# Patient Record
Sex: FEMALE | Race: ASIAN | NOT HISPANIC OR LATINO | Employment: OTHER
[De-identification: names, ages, dates, MRNs, and addresses within clinical notes are randomized per-mention and may not be internally consistent; named-entity substitution may affect disease eponyms.]

---

## 2020-07-27 ENCOUNTER — RECORDS - HEALTHEAST (OUTPATIENT)
Dept: ADMINISTRATIVE | Facility: OTHER | Age: 80
End: 2020-07-27

## 2020-08-17 ENCOUNTER — OFFICE VISIT - HEALTHEAST (OUTPATIENT)
Dept: CARDIOLOGY | Facility: CLINIC | Age: 80
End: 2020-08-17

## 2020-08-17 DIAGNOSIS — R06.02 SOB (SHORTNESS OF BREATH): ICD-10-CM

## 2020-08-17 RX ORDER — SITAGLIPTIN 100 MG/1
TABLET, FILM COATED ORAL
Status: SHIPPED | COMMUNITY
Start: 2020-06-29 | End: 2022-11-18

## 2020-08-17 RX ORDER — LISINOPRIL 10 MG/1
10 TABLET ORAL DAILY
Status: SHIPPED | COMMUNITY
Start: 2020-07-24 | End: 2022-11-18

## 2020-08-18 LAB
ATRIAL RATE - MUSE: 63 BPM
DIASTOLIC BLOOD PRESSURE - MUSE: NORMAL
INTERPRETATION ECG - MUSE: NORMAL
P AXIS - MUSE: 18 DEGREES
PR INTERVAL - MUSE: 202 MS
QRS DURATION - MUSE: 80 MS
QT - MUSE: 404 MS
QTC - MUSE: 413 MS
R AXIS - MUSE: 0 DEGREES
SYSTOLIC BLOOD PRESSURE - MUSE: NORMAL
T AXIS - MUSE: 92 DEGREES
VENTRICULAR RATE- MUSE: 63 BPM

## 2020-08-26 ENCOUNTER — HOSPITAL ENCOUNTER (OUTPATIENT)
Dept: CARDIOLOGY | Facility: CLINIC | Age: 80
Discharge: HOME | End: 2020-08-26
Attending: INTERNAL MEDICINE

## 2020-08-26 DIAGNOSIS — R06.02 SOB (SHORTNESS OF BREATH): ICD-10-CM

## 2020-08-26 LAB
AORTIC ROOT: 3.3 CM
AORTIC VALVE MEAN VELOCITY: 123 CM/S
AR DECEL SLOPE: 1750 MM/S2
AR PEAK VELOCITY: 426 CM/S
ASCENDING AORTA: 3.6 CM
AV DIMENSIONLESS INDEX VTI: 0.7
AV MEAN GRADIENT: 7 MMHG
AV PEAK GRADIENT: 12.5 MMHG
AV REGURGITANT PEAK GRADIENT: 72.6 MMHG
AV REGURGITATION PRESSURE HALF TIME: 715 MS
AV VALVE AREA: 1.9 CM2
AV VELOCITY RATIO: 0.7
CV BLOOD PRESSURE: NORMAL MMHG
CV ECHO WEIGHT: 120 LBS
DOP CALC AO PEAK VEL: 177 CM/S
DOP CALC AO VTI: 41.5 CM
DOP CALC LVOT AREA: 2.83 CM2
DOP CALC LVOT DIAMETER: 1.9 CM
DOP CALC LVOT PEAK VEL: 127 CM/S
DOP CALC LVOT STROKE VOLUME: 80.5 CM3
DOP CALCLVOT PEAK VEL VTI: 28.4 CM
EJECTION FRACTION: 58 % (ref 55–75)
LA AREA 1: 13.5 CM2
LA AREA 2: 14.5 CM2
LEFT ATRIUM LENGTH: 4.87 CM
LEFT ATRIUM SIZE: 3.5 CM
LEFT ATRIUM TO AORTIC ROOT RATIO: 1.06 NO UNITS
LEFT ATRIUM VOLUME: 34.2 ML
LEFT VENTRICLE DIASTOLIC VOLUME: 55 CM3 (ref 46–106)
LEFT VENTRICLE SYSTOLIC VOLUME: 23 CM3 (ref 14–42)
LEFT VENTRICULAR OUTFLOW TRACT MEAN GRADIENT: 4 MMHG
LEFT VENTRICULAR OUTFLOW TRACT MEAN VELOCITY: 93 CM/S
LEFT VENTRICULAR OUTFLOW TRACT PEAK GRADIENT: 6 MMHG
MITRAL VALVE E/A RATIO: 0.7
MV AVERAGE E/E' RATIO: 14.3 CM/S
MV DECELERATION TIME: 289 MS
MV E'TISSUE VEL-LAT: 4.78 CM/S
MV E'TISSUE VEL-MED: 4.58 CM/S
MV LATERAL E/E' RATIO: 14
MV MEDIAL E/E' RATIO: 14.6
MV PEAK A VELOCITY: 98.7 CM/S
MV PEAK E VELOCITY: 66.9 CM/S
NUC REST DIASTOLIC VOLUME INDEX: 1920 LBS
TRICUSPID REGURGITATION PEAK PRESSURE GRADIENT: 27.5 MMHG
TRICUSPID VALVE ANULAR PLANE SYSTOLIC EXCURSION: 2.6 CM
TRICUSPID VALVE PEAK REGURGITANT VELOCITY: 262 CM/S

## 2020-09-14 ENCOUNTER — COMMUNICATION - HEALTHEAST (OUTPATIENT)
Dept: CARDIOLOGY | Facility: CLINIC | Age: 80
End: 2020-09-14

## 2021-06-04 VITALS — WEIGHT: 120 LBS

## 2021-06-04 VITALS
SYSTOLIC BLOOD PRESSURE: 100 MMHG | WEIGHT: 120.5 LBS | HEART RATE: 62 BPM | OXYGEN SATURATION: 98 % | RESPIRATION RATE: 18 BRPM | DIASTOLIC BLOOD PRESSURE: 54 MMHG

## 2021-06-10 NOTE — PROGRESS NOTES
[facility] [place] Note    Assessment:    Tracie Yen is a 79 y.o. old female with HL, DM, here for evaluation of slow HR prior to planned endoscopy.        Plan:  # Bradycardia/pre-procedural risk stratification  - in and of itself, her sinus bradycardia, even with a first degree AVB, should not preclude her from being able to get her endoscopy  - moreover, the fact that historically she is able to do more than a flight of stairs is reassuring and makes her intermediate risk for a low risk procedure  - repeat EKG shows NSR at 61w/ unchanged inferior q's, CA conduction slightly prolonged  - given her age and absence of any prior CV work up, would like to at least get a TTE, and if no decrease in LVEF or significant valvular disease, proceed w/ endoscopy as planned    MARY MALIN  [facility]  [phone]  ______________________________________________________________________    Subjective:  CC: Slow HR    I had the opportunity to see Tracie Yen at the [facility]. Tracie Yen is a 79 y.o. female with a known history of HL, DM, here for evaluation of slow HR prior to planned endoscopy.    She was seen in the ED end ogf June '20 for decreased PO intake, vomiting, consistent with gastroparesis by description. At that time, an EKG was done, significant for SB at 50's, 1st degree AVB, inferior Q's. She nor her PCP thought much of it, but as her endoscopy is being delayed due to abnromal EKG,s he presents today for Cardiology evaluation.    She denies CP/SOB/STARKS/orthopnea/PND/edema/symcope/N/V/D/F/C. She is able to do a flight of stairs per the daughter, mainly limited by a decade-old leg pain.    Not smoker/drinker.   ______________________________________________________________________      Review of Systems:   As noted in HPI, all others reviewed and are negative      Problem List:  Patient Active Problem List   Diagnosis     Hyperlipidemia     Type 2 Diabetes Mellitus -  Uncomplicated, Uncontrolled     Joint Pain, Localized In The Hip     Edema     Medical History:  Past Medical History:   Diagnosis Date     DM2 (diabetes mellitus, type 2) (H)      HLD (hyperlipidemia)      Surgical History:  No past surgical history on file.  Social History:  Social History     Socioeconomic History     Marital status:      Spouse name: Not on file     Number of children: Not on file     Years of education: Not on file     Highest education level: Not on file   Occupational History     Not on file   Social Needs     Financial resource strain: Not on file     Food insecurity     Worry: Not on file     Inability: Not on file     Transportation needs     Medical: Not on file     Non-medical: Not on file   Tobacco Use     Smoking status: Not on file   Substance and Sexual Activity     Alcohol use: Not on file     Drug use: Not on file     Sexual activity: Not on file   Lifestyle     Physical activity     Days per week: Not on file     Minutes per session: Not on file     Stress: Not on file   Relationships     Social connections     Talks on phone: Not on file     Gets together: Not on file     Attends Restoration service: Not on file     Active member of club or organization: Not on file     Attends meetings of clubs or organizations: Not on file     Relationship status: Not on file     Intimate partner violence     Fear of current or ex partner: Not on file     Emotionally abused: Not on file     Physically abused: Not on file     Forced sexual activity: Not on file   Other Topics Concern     Not on file   Social History Narrative     Not on file           Family History:  No family history on file.      Allergies:  No Known Allergies    Medications:  Current Outpatient Medications   Medication Sig Dispense Refill     aspirin 81 MG EC tablet Take 81 mg by mouth daily.       blood sugar diagnostic (GLUCOSE BLOOD) Strp OneTouch Ultra Blue In Vitro Strip. Test 2 to 3 times daily.        blood-glucose meter kit Use As Directed. OneTouch Ultra Mini w/Device Kit. Use to check blood sugars 2 to 3 times a day.       glimepiride (AMARYL) 4 MG tablet Take 8 mg by mouth every morning.       metFORMIN (GLUCOPHAGE) 1000 MG tablet Take 1,000 mg by mouth 2 (two) times a day.       ONETOUCH FINEPOINT LANCETS MISC Test 2 to 3 times a day.       pantoprazole (PROTONIX) 20 MG tablet Take 1 tablet (20 mg total) by mouth daily. 20 tablet 0     pioglitazone (ACTOS) 30 MG tablet Take 30 mg by mouth daily.       simvastatin (ZOCOR) 20 MG tablet Take 20 mg by mouth daily.       No current facility-administered medications for this visit.        Objective:   Vital signs:  There were no vitals taken for this visit.      Physical Exam:    GENERAL APPEARANCE: Alert, cooperative and in no acute distress.   HEENT: No scleral icterus. Oral mucuos membranes pink and moist.   NECK: JVP 6. No Hepatojugular reflux. Thyroid not visualized. No lymphadenopathy   CHEST: clear to auscultation   CARDIOVASCULAR: S1, S2 without murmur ,clicks or rubs. Brachial, radial and posterior tibial pulses are intact and symetric. No carotid bruits noted. No edema  ABDOMEN: Nontender. BS+. No bruits.   SKIN: No Xanthelasma   Musculoskeletal: No cyanosis, clubbing or swelling.      Lab Results:  LIPIDS:  No results found for: CHOL  No results found for: HDL  No results found for: LDLCALC  No results found for: TRIG  No components found for: CHOLHDL    BMP:  Lab Results   Component Value Date    CREATININE 1.78 (H) 06/26/2020    BUN 26 06/26/2020     06/26/2020    K 4.5 06/26/2020    CL 99 06/26/2020    CO2 27 06/26/2020         [facility]

## 2021-06-20 NOTE — LETTER
Letter by Virgen Villarreal RN at      Author: Virgen Villarreal RN Service: -- Author Type: --    Filed:  Encounter Date: 9/14/2020 Status: (Other)         Tracie Yen  [address] [address]             September 14, 2020         Dear Ms. Yen,    Below are the results from your recent visit:    Resulted Orders   Echo Complete   Result Value Ref Range    LV volume diastolic 55 46 - 106 cm3    LV volume systolic 23 14 - 42 cm3    LVOT diam 1.9 cm    LVOT mean gradient 4 mmHg    LVOT peak VTI 28.4 cm    LVOT mean laura 93 cm/s    LVOT peak laura 127 cm/s    LVOT peak gradient 6 mmHg    MV E' lat laura 4.78 cm/s    MV E' med laura 4.58 cm/s    AR decel slope 1,750 mm/s2    AR p 1/2 time 715 ms    AR peak laura 426 cm/s    AV peak laura 177 cm/s    AV mean laura 123 cm/s    AV mean gradient 7 mmHg    AV VTI 41.5 cm    AO root 3.3 cm    AO ascending 3.6 cm    LA size 3.5 cm    LA/AO root ratio 1.06 no units    MV decel time 289 ms    MV peak A laura 98.7 cm/s    MV peak E laura 66.9 cm/s    TR peak laura 262 cm/s    LA area 2 14.5 cm2    LA area 1 13.5 cm2    LA length 4.87 cm    TAPSE 2.6 cm    Weight 1,920 lbs    /65 mmHg    TR peak gradent 27.5 mmHg    Echo LVEF calculated 58 55 - 75 %    LA volume 34.2 mL    AV area 1.9 cm2    AV DIM IND laura 0.7     MV E/A Ratio 0.7     LVOT area 2.83 cm2    LVOT SV 80.5 cm3    AV peak gradient 12.5 mmHg    MV med E/e' ratio 14.6     MV lat E/e' ratio 14.0     Weight 120 lbs    MV Avg E/e' Ratio 14.3 cm/s    AR peak gradient 72.6 mmHg    AV DIM IND VTI 0.7     Addendum: 8/26/2020    1. Normal left ventricular size and systolic performance with a visually estimated ejection fraction of 60%.   2. There is mild aortic insufficiency.   3. Normal right ventricular size and systolic performance.       Narrative    1. Normal left ventricular size and systolic performance with a visually   estimated ejection fraction of 60%.   2. No significant valvular heart disease is identified on this  study.   3. Normal right ventricular size and systolic performance.      Your echocardiogram looks good. You are able to proceed with your endoscopy as planned.    Please call with questions.    Sincerely,    Dr. Oropeza    Electronically signed by Virgen Villarreal RN

## 2021-08-18 ENCOUNTER — HOSPITAL ENCOUNTER (EMERGENCY)
Facility: CLINIC | Age: 81
Discharge: HOME | End: 2021-08-18

## 2021-08-18 VITALS
OXYGEN SATURATION: 96 % | HEART RATE: 56 BPM | HEIGHT: 59 IN | BODY MASS INDEX: 24.19 KG/M2 | RESPIRATION RATE: 20 BRPM | WEIGHT: 120 LBS | DIASTOLIC BLOOD PRESSURE: 76 MMHG | SYSTOLIC BLOOD PRESSURE: 179 MMHG | TEMPERATURE: 98.2 F

## 2021-08-18 ASSESSMENT — MIFFLIN-ST. JEOR: SCORE: 909.95

## 2021-08-18 NOTE — ED TRIAGE NOTES
Began feeling bad Monday night with productive cough, fatigue and vomiting. Diarrhea started 2 days ago. No other household members have been sick. Has had pfizer covid vaccine. Speak Hmong and daughter interpreting

## 2022-11-18 ENCOUNTER — OFFICE VISIT (OUTPATIENT)
Dept: FAMILY MEDICINE | Facility: CLINIC | Age: 82
End: 2022-11-18
Payer: COMMERCIAL

## 2022-11-18 VITALS
DIASTOLIC BLOOD PRESSURE: 60 MMHG | HEART RATE: 60 BPM | BODY MASS INDEX: 25.8 KG/M2 | WEIGHT: 128 LBS | SYSTOLIC BLOOD PRESSURE: 124 MMHG | TEMPERATURE: 97.1 F | HEIGHT: 59 IN

## 2022-11-18 DIAGNOSIS — E11.40 TYPE 2 DIABETES MELLITUS WITH DIABETIC NEUROPATHY, WITHOUT LONG-TERM CURRENT USE OF INSULIN (H): ICD-10-CM

## 2022-11-18 DIAGNOSIS — E78.2 MIXED HYPERLIPIDEMIA: ICD-10-CM

## 2022-11-18 DIAGNOSIS — Z76.89 ENCOUNTER TO ESTABLISH CARE: Primary | ICD-10-CM

## 2022-11-18 DIAGNOSIS — G62.9 PERIPHERAL POLYNEUROPATHY: ICD-10-CM

## 2022-11-18 DIAGNOSIS — I10 BENIGN ESSENTIAL HYPERTENSION: ICD-10-CM

## 2022-11-18 LAB
ALBUMIN SERPL BCG-MCNC: 3.9 G/DL (ref 3.5–5.2)
ALP SERPL-CCNC: 92 U/L (ref 35–104)
ALT SERPL W P-5'-P-CCNC: 12 U/L (ref 10–35)
ANION GAP SERPL CALCULATED.3IONS-SCNC: 10 MMOL/L (ref 7–15)
AST SERPL W P-5'-P-CCNC: 18 U/L (ref 10–35)
BILIRUB SERPL-MCNC: 0.3 MG/DL
BUN SERPL-MCNC: 48.2 MG/DL (ref 8–23)
CALCIUM SERPL-MCNC: 9.1 MG/DL (ref 8.8–10.2)
CHLORIDE SERPL-SCNC: 103 MMOL/L (ref 98–107)
CHOLEST SERPL-MCNC: 193 MG/DL
CREAT SERPL-MCNC: 1.18 MG/DL (ref 0.51–0.95)
CREAT UR-MCNC: 41.5 MG/DL
DEPRECATED HCO3 PLAS-SCNC: 24 MMOL/L (ref 22–29)
GFR SERPL CREATININE-BSD FRML MDRD: 46 ML/MIN/1.73M2
GLUCOSE SERPL-MCNC: 238 MG/DL (ref 70–99)
HBA1C MFR BLD: 9.5 % (ref 0–5.6)
HDLC SERPL-MCNC: 35 MG/DL
LDLC SERPL CALC-MCNC: 109 MG/DL
MICROALBUMIN UR-MCNC: <12 MG/L
MICROALBUMIN/CREAT UR: NORMAL MG/G{CREAT}
NONHDLC SERPL-MCNC: 158 MG/DL
POTASSIUM SERPL-SCNC: 4.7 MMOL/L (ref 3.4–5.3)
PROT SERPL-MCNC: 6.9 G/DL (ref 6.4–8.3)
SODIUM SERPL-SCNC: 137 MMOL/L (ref 136–145)
TRIGL SERPL-MCNC: 247 MG/DL

## 2022-11-18 PROCEDURE — 90677 PCV20 VACCINE IM: CPT | Performed by: NURSE PRACTITIONER

## 2022-11-18 PROCEDURE — 82043 UR ALBUMIN QUANTITATIVE: CPT | Performed by: NURSE PRACTITIONER

## 2022-11-18 PROCEDURE — G0008 ADMIN INFLUENZA VIRUS VAC: HCPCS | Performed by: NURSE PRACTITIONER

## 2022-11-18 PROCEDURE — 83036 HEMOGLOBIN GLYCOSYLATED A1C: CPT | Performed by: NURSE PRACTITIONER

## 2022-11-18 PROCEDURE — 90662 IIV NO PRSV INCREASED AG IM: CPT | Performed by: NURSE PRACTITIONER

## 2022-11-18 PROCEDURE — 99204 OFFICE O/P NEW MOD 45 MIN: CPT | Mod: 25 | Performed by: NURSE PRACTITIONER

## 2022-11-18 PROCEDURE — 80061 LIPID PANEL: CPT | Performed by: NURSE PRACTITIONER

## 2022-11-18 PROCEDURE — 36415 COLL VENOUS BLD VENIPUNCTURE: CPT | Performed by: NURSE PRACTITIONER

## 2022-11-18 PROCEDURE — 80053 COMPREHEN METABOLIC PANEL: CPT | Performed by: NURSE PRACTITIONER

## 2022-11-18 PROCEDURE — G0009 ADMIN PNEUMOCOCCAL VACCINE: HCPCS | Performed by: NURSE PRACTITIONER

## 2022-11-18 PROCEDURE — 99207 PR FOOT EXAM NO CHARGE: CPT | Mod: 25 | Performed by: NURSE PRACTITIONER

## 2022-11-18 RX ORDER — METFORMIN HYDROCHLORIDE 750 MG/1
750 TABLET, EXTENDED RELEASE ORAL DAILY
COMMUNITY
End: 2022-11-18

## 2022-11-18 RX ORDER — PREGABALIN 50 MG/1
50 CAPSULE ORAL DAILY
COMMUNITY
End: 2022-11-18

## 2022-11-18 RX ORDER — LOSARTAN POTASSIUM 50 MG/1
50 TABLET ORAL DAILY
COMMUNITY
End: 2022-11-18

## 2022-11-18 RX ORDER — PIOGLITAZONEHYDROCHLORIDE 45 MG/1
45 TABLET ORAL DAILY
COMMUNITY
End: 2022-11-18

## 2022-11-18 NOTE — PROGRESS NOTES
"  Assessment & Plan     Encounter to establish care    Type 2 diabetes mellitus with diabetic neuropathy, without long-term current use of insulin (H)  Uncontrolled with Hgb A1c of 9.5.  Will refer to MTM to discuss additional medications for control.   - FOOT EXAM  - Hemoglobin A1c  - Albumin Random Urine Quantitative with Creat Ratio  - empagliflozin (JARDIANCE) 25 MG TABS tablet  Dispense: 90 tablet; Refill: 3  - metFORMIN (GLUCOPHAGE-XR) 750 MG 24 hr tablet  Dispense: 90 tablet; Refill: 3  - pioglitazone (ACTOS) 45 MG tablet  Dispense: 90 tablet; Refill: 3    Mixed hyperlipidemia  Suboptimal control.  Increase Simvastatin to 40 mg.   - Lipid panel reflex to direct LDL Fasting  - simvastatin (ZOCOR) 40 MG tablet  Dispense: 90 tablet; Refill: 3    Benign essential hypertension  Controlled.  - Comprehensive metabolic panel (BMP + Alb, Alk Phos, ALT, AST, Total. Bili, TP)  - losartan (COZAAR) 50 MG tablet  Dispense: 90 tablet; Refill: 3    Peripheral polyneuropathy  - pregabalin (LYRICA) 50 MG capsule  Dispense: 90 capsule; Refill: 3         BMI:   Estimated body mass index is 25.85 kg/m  as calculated from the following:    Height as of this encounter: 1.499 m (4' 11\").    Weight as of this encounter: 58.1 kg (128 lb).       Return in about 6 months (around 5/18/2023) for Follow up, in person, with me.    Faith Genao NP  [facility]    Giulia Hodges is a 81 year old accompanied by her granddaughter who presents to establish care.  Granddaughter is helping with interpreting.  Patient declines a professional . Patient has most recently been seeing a provider in [facility].  An MEKA was sent for records.  Patient lives with family.  Has 7 adult children.  Goes to adult  4-5x per week.  Granddaughter reports her cognition is normal.     History of Type 2 Diabetes.  She is on Metformin, Actos, and Jardiance.  Fasting BS this morning was 179.  She is overdue for an " "eye exam.  On Lyrica for peripheral neuropathy.     On Losartan for hypertension.  She is not checking Bps regularly.    Declines any concerns today.     History of Present Illness       Reason for visit:  New primary check up    She eats 2-3 servings of fruits and vegetables daily.She consumes 1 sweetened beverage(s) daily.She exercises with enough effort to increase her heart rate 20 to 29 minutes per day.  She exercises with enough effort to increase her heart rate 7 days per week.   She is taking medications regularly.     Refill all medications. Also need lancets and test strips. Checks blood sugars once daily (2-3 times if not feeling well). No recent blood sugars over 200. 179 this morning. Cold feet at times. Last DM eye exam over a year ago.     She does check BP at home. Brought home cuff in today for comparison with reading of 161/64.    Has received 4 COVID vaccines. Will do flu and pneumonia vaccine today.       Review of Systems   Pertinent items in HPI      Objective    /60 (BP Location: Right arm, Patient Position: Sitting, Cuff Size: Adult Regular)   Pulse 60   Temp 97.1  F (36.2  C) (Temporal)   Ht 1.499 m (4' 11\")   Wt 58.1 kg (128 lb)   BMI 25.85 kg/m    Body mass index is 25.85 kg/m .  Physical Exam   GENERAL: healthy, alert and no distress  EYES: Eyes grossly normal to inspection, PERRL and conjunctivae and sclerae normal  NECK: no adenopathy, no asymmetry, masses, or scars and thyroid normal to palpation  RESP: lungs clear to auscultation - no rales, rhonchi or wheezes  CV: regular rate and rhythm, normal S1 S2, no S3 or S4, no murmur, click or rub, no peripheral edema  ABDOMEN: soft, nontender, no hepatosplenomegaly, no masses and bowel sounds normal  Diabetic foot exam: normal DP and PT pulses and no trophic changes or ulcerative lesions          "

## 2022-11-22 RX ORDER — PREGABALIN 50 MG/1
50 CAPSULE ORAL DAILY
Qty: 90 CAPSULE | Refills: 3 | Status: SHIPPED | OUTPATIENT
Start: 2022-11-22 | End: 2023-06-02

## 2022-11-22 RX ORDER — SIMVASTATIN 40 MG
40 TABLET ORAL AT BEDTIME
Qty: 90 TABLET | Refills: 3 | Status: SHIPPED | OUTPATIENT
Start: 2022-11-22 | End: 2023-01-02

## 2022-11-22 RX ORDER — METFORMIN HYDROCHLORIDE 750 MG/1
750 TABLET, EXTENDED RELEASE ORAL DAILY
Qty: 90 TABLET | Refills: 3 | Status: SHIPPED | OUTPATIENT
Start: 2022-11-22 | End: 2023-02-07

## 2022-11-22 RX ORDER — PIOGLITAZONEHYDROCHLORIDE 45 MG/1
45 TABLET ORAL DAILY
Qty: 90 TABLET | Refills: 3 | Status: SHIPPED | OUTPATIENT
Start: 2022-11-22 | End: 2023-11-20

## 2022-11-22 RX ORDER — LOSARTAN POTASSIUM 50 MG/1
50 TABLET ORAL DAILY
Qty: 90 TABLET | Refills: 3 | Status: SHIPPED | OUTPATIENT
Start: 2022-11-22 | End: 2023-11-20

## 2022-11-28 ENCOUNTER — TELEPHONE (OUTPATIENT)
Dept: FAMILY MEDICINE | Facility: CLINIC | Age: 82
End: 2022-11-28

## 2022-11-28 NOTE — TELEPHONE ENCOUNTER
----- Message from Faith Genao NP sent at 11/22/2022  3:32 PM CST -----  Please call patient's daughter Wes.    Tracie's labs show that her diabetes is not as controlled as I would like it.  I would like her to meet with our pharmacist to discuss her medications.  She should be getting a call to help schedule this.    Her cholesterol is up slightly.  I am going to increase her cholesterol medication.    I want to see Tracie back in 3 months.     Faith Genao NP

## 2022-12-29 NOTE — PROGRESS NOTES
Medication Therapy Management (MTM) Encounter    ASSESSMENT:                            1. Type 2 diabetes mellitus  Most recent A1c above goal <8% per ADA guidelines based on age. Recommend increasing metformin dose given adequate renal function, low current dose, and tolerating. Reviewed importance of good blood sugar control, but patient and daughter decline any medication changes today. Another option is adding back Januvia, which appears to have been stopped when Jardiance was started. These medications can be taken together. Agreed to have A1c rechecked next month and if elevated, strongly recommend at least increasing metformin dose. Will discuss with PCP.     2. Benign essential hypertension  Blood pressure is well controlled and meeting goal of <130/80 mm Hg per ACC/AHA hypertension guidelines.    3. Mixed hyperlipidemia  Appropriately on a moderate intensity statin given age and diabetes diagnosis per ACC/AHA guidelines. Dose recently increased per PCP to help lower LDL further, but patient has not increased yet. Explained recommendation and rationale. Declines dose increase at this time so medication list updated to reflect current dosing. Can recheck with future labs.     4. Peripheral polyneuropathy  Stable, well controlled on Lyrica.       PLAN:                            Recommend increasing metformin ER to 1500 mg daily, but patient declines. Will have A1c rechecked with PCP next month.       Follow-up: Return in about 1 month (around 2/2/2023).    SUBJECTIVE/OBJECTIVE:                          Tracie Yen is a 82 year old female coming in for an initial visit. She was referred to me from Faith Genao CNP. Patient was accompanied by daughter, who served as ..     Reason for visit: diabetes    Allergies/ADRs: Reviewed in chart  Past Medical History: Reviewed in chart  Tobacco: She reports that she has never smoked. She has never used smokeless tobacco.  Alcohol: none    Medication  Adherence/Access: Daughter sets up patient's medications. Wishes to take least amount of pills as possible.     1. Type 2 diabetes mellitus  Currently taking metformin  mg daily, pioglitazone 45 mg daily, and Jardiance 25 mg daily. Was on Januvia in the past, but then stopped when Jardiance was started. Patient is not experiencing side effects. Does not want to be on insulin or any injectable medication.   Blood sugar monitorin-2 time(s) daily. Ranges (patient reported): Fasting- 150-190; Post-Prandial- 150-160s  Symptoms of low blood sugar? none  Symptoms of high blood sugar? none  Diet/Exercise: small portions, feels she is a healthy weight, goes to adult  4 days a week  Aspirin: Not taking due to age/primary prevention  Statin: Yes: simvastatin   ACEi/ARB: Yes: losartan     Hemoglobin A1C   Date Value Ref Range Status   2022 9.5 (H) 0.0 - 5.6 % Final     Comment:     Normal <5.7%   Prediabetes 5.7-6.4%    Diabetes 6.5% or higher     Note: Adopted from ADA consensus guidelines.   2014 10.3 (H) 3.5 - 6.0 % Final   2014 9.8 (H) 3.5 - 6.0 % Final      No results found for: MICROALBUR   Creatinine Urine mg/dL   Date Value Ref Range Status   2022 41.5 mg/dL Final     Comment:     The reference ranges have not been established in urine creatinine. The results should be integrated into the clinical context for interpretation.     LDL Cholesterol Calculated   Date Value Ref Range Status   2022 109 (H) <=100 mg/dL Final     LDL Cholesterol Direct   Date Value Ref Range Status   2014 97 <130 mg/dL Final     Creatinine   Date Value Ref Range Status   2022 1.18 (H) 0.51 - 0.95 mg/dL Final     2. Benign essential hypertension  Current medications include losartan 25 mg daily.  Patient does not self-monitor blood pressure.  Patient reports no current medication side effects.  BP Readings from Last 3 Encounters:   23 124/60   22 124/60   20 100/54        3. Mixed hyperlipidemia  Current therapy includes simvastatin 20 mg daily.  Patient reports no significant myalgias or other side effects.. PCP recently increased dose of simvastatin, but patient has not increased yet. Still has bottle of 20 mg and doesn't want to increase dose.     Recent Labs   Lab Test 11/18/22  1059   CHOL 193   HDL 35*   *   TRIG 247*       4. Peripheral polyneuropathy  Currently taking Lyrica 50 mg daily. Does feel this medication helps with neuropathy symptoms. No side effects.       Vitals:   BP Readings from Last 3 Encounters:   01/02/23 124/60   11/18/22 124/60   08/17/20 100/54      Pulse Readings from Last 3 Encounters:   01/02/23 60   11/18/22 60   08/17/20 62     Wt Readings from Last 3 Encounters:   01/02/23 128 lb (58.1 kg)   11/18/22 128 lb (58.1 kg)   08/17/20 120 lb 8 oz (54.7 kg)     ----------------    I spent 30 minutes with this patient today. All changes were made via collaborative practice agreement with Faith Genao NP. A copy of the visit note was provided to the patient's provider(s).    A summary of these recommendations was given to the patient.    Saranya Villasenor, DarD, BCACP  Medication Management (MTM) Pharmacist  [facility]        Medication Therapy Recommendations  Type 2 diabetes mellitus with diabetic neuropathy, without long-term current use of insulin (H)    Current Medication: metFORMIN (GLUCOPHAGE-XR) 750 MG 24 hr tablet   Rationale: Dose too low - Dosage too low - Effectiveness   Recommendation: Increase Dose   Status: Declined per Patient

## 2023-01-02 ENCOUNTER — OFFICE VISIT (OUTPATIENT)
Dept: PHARMACY | Facility: CLINIC | Age: 83
End: 2023-01-02
Payer: COMMERCIAL

## 2023-01-02 VITALS
HEART RATE: 60 BPM | DIASTOLIC BLOOD PRESSURE: 60 MMHG | WEIGHT: 128 LBS | BODY MASS INDEX: 25.85 KG/M2 | SYSTOLIC BLOOD PRESSURE: 124 MMHG

## 2023-01-02 DIAGNOSIS — G62.9 PERIPHERAL POLYNEUROPATHY: ICD-10-CM

## 2023-01-02 DIAGNOSIS — E78.2 MIXED HYPERLIPIDEMIA: ICD-10-CM

## 2023-01-02 DIAGNOSIS — I10 BENIGN ESSENTIAL HYPERTENSION: ICD-10-CM

## 2023-01-02 DIAGNOSIS — E11.40 TYPE 2 DIABETES MELLITUS WITH DIABETIC NEUROPATHY, WITHOUT LONG-TERM CURRENT USE OF INSULIN (H): Primary | ICD-10-CM

## 2023-01-02 PROCEDURE — 99605 MTMS BY PHARM NP 15 MIN: CPT | Performed by: PHARMACIST

## 2023-01-02 PROCEDURE — 99607 MTMS BY PHARM ADDL 15 MIN: CPT | Performed by: PHARMACIST

## 2023-01-02 RX ORDER — SIMVASTATIN 20 MG
20 TABLET ORAL AT BEDTIME
Qty: 90 TABLET | Refills: 3 | Status: SHIPPED | OUTPATIENT
Start: 2023-01-02 | End: 2024-02-19

## 2023-01-02 RX ORDER — FUROSEMIDE 20 MG
20 TABLET ORAL DAILY PRN
COMMUNITY
Start: 2022-07-05 | End: 2023-01-02

## 2023-01-02 NOTE — LETTER
_  Medication List        Prepared on: Jan 2, 2023     Bring your Medication List when you go to the doctor, hospital, or   emergency room. And, share it with your family or caregivers.     Note any changes to how you take your medications.  Cross out medications when you no longer use them.    Medication How I take it Why I use it Prescriber   empagliflozin (JARDIANCE) 25 MG TABS tablet Take 1 tablet (25 mg) by mouth daily Type 2 diabetes  Faith Genao NP   losartan (COZAAR) 50 MG tablet Take 1 tablet (50 mg) by mouth daily Blood pressure  Faith Genao NP   metFORMIN (GLUCOPHAGE-XR) 750 MG 24 hr tablet Take 1 tablet (750 mg) by mouth daily Type 2 diabetes  Faith Genao NP   pioglitazone (ACTOS) 45 MG tablet Take 1 tablet (45 mg) by mouth daily Type 2 diabetes  Faith Genao NP   pregabalin (LYRICA) 50 MG capsule Take 1 capsule (50 mg) by mouth daily Neuropathy Faith Genao NP   simvastatin (ZOCOR) 20 MG tablet Take 1 tablet (20 mg) by mouth At Bedtime Type 2 diabetes mellitus  Faith Genao NP         Add new medications, over-the-counter drugs, herbals, vitamins, or  minerals in the blank rows below.    Medication How I take it Why I use it Prescriber                          Allergies:      penicillins        Side effects I have had:               Other Information:              My notes and questions:

## 2023-01-02 NOTE — LETTER
"Recommended To-Do List      Prepared on: Jan 2, 2023       You can get the best results from your medications by completing the items on this \"To-Do List.\"      Bring your To-Do List when you go to your doctor. And, share it with your family or caregivers.    My To-Do List:  What we talked about: What I should do:    What my medicines are for, how to know if my medicines are working, made sure my medicinesare safe for me and reviewed how to take my medicines.      Take my medicines every day.          What we talked about: What I should do:    Possibly increasing metformin dose if next A1c is high.    Have A1c rechecked with Faith Genao CNP next month.                 "

## 2023-01-02 NOTE — LETTER
January 2, 2023  Mayeliazar Yovana  [address] [address]    [address] [facility]     Thank you for talking with me on Jan 2, 2023 about your health and medications. As a follow-up to our conversation, I have included two documents:      1. Your Recommended To-Do List has steps you should take to get the best results from your medications.  2. Your Medication List will help you keep track of your medications and how to take them.    If you want to talk about these documents, please call Saranya Villasenor PharmD at phone: [phone], Monday-Friday 8-4:30pm.    I look forward to working with you and your doctors to make sure your medications work well for you.    Sincerely,  Saranya Villasenor, PharmD  [facility] Pharmacist, [facility]

## 2023-01-27 ENCOUNTER — APPOINTMENT (OUTPATIENT)
Dept: CT IMAGING | Facility: CLINIC | Age: 83
End: 2023-01-27
Attending: EMERGENCY MEDICINE
Payer: COMMERCIAL

## 2023-01-27 ENCOUNTER — HOSPITAL ENCOUNTER (EMERGENCY)
Facility: CLINIC | Age: 83
Discharge: HOME | End: 2023-01-27
Attending: EMERGENCY MEDICINE | Admitting: EMERGENCY MEDICINE
Payer: COMMERCIAL

## 2023-01-27 VITALS
SYSTOLIC BLOOD PRESSURE: 152 MMHG | TEMPERATURE: 97.3 F | WEIGHT: 128 LBS | DIASTOLIC BLOOD PRESSURE: 67 MMHG | BODY MASS INDEX: 26.87 KG/M2 | OXYGEN SATURATION: 91 % | RESPIRATION RATE: 16 BRPM | HEIGHT: 58 IN | HEART RATE: 67 BPM
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
## 2023-01-27 DIAGNOSIS — S20.229A CONTUSION OF BACK, UNSPECIFIED LATERALITY, INITIAL ENCOUNTER: ICD-10-CM

## 2023-01-27 PROCEDURE — 99285 EMERGENCY DEPT VISIT HI MDM: CPT | Mod: 25

## 2023-01-27 PROCEDURE — 70450 CT HEAD/BRAIN W/O DYE: CPT

## 2023-01-27 PROCEDURE — 72128 CT CHEST SPINE W/O DYE: CPT

## 2023-01-27 PROCEDURE — 72125 CT NECK SPINE W/O DYE: CPT

## 2023-01-27 PROCEDURE — 72131 CT LUMBAR SPINE W/O DYE: CPT

## 2023-01-27 ASSESSMENT — ENCOUNTER SYMPTOMS
DIZZINESS: 0
SHORTNESS OF BREATH: 0
NECK PAIN: 0
VOMITING: 0
NUMBNESS: 0

## 2023-01-27 NOTE — ED PROVIDER NOTES
EMERGENCY DEPARTMENT ENCOUNTER     NAME: Tracie Yen   AGE: 82 year old female   YOB: 1940   MRN: [id number]   EVALUATION DATE & TIME: 1/27/2023  9:15 AM   PCP: Faith Genao     Chief Complaint   Patient presents with     Fall     Head Injury     Back Pain   :    FINAL IMPRESSION       1. Fall, initial encounter    2. Contusion of back, unspecified laterality, initial encounter           ED COURSE & MEDICAL DECISION MAKING      Pertinent Labs & Imaging studies reviewed. (See chart for details)   82 year old female  presents to the Emergency Department for evaluation of a fall with lower back pain.  This occurred mechanically 2 days ago when she slipped and fell on ice. Initial Vitals Reviewed. Initial exam notable for generally well-appearing patient with a GCS of 15, no C-spine tenderness, isolated tenderness over the lumbosacral spine.  No external signs of trauma are visualized.  Suspect contusion and she is ambulatory, though per family slower than normal.  She did have a posterior scalp hematoma so I did a CT of the head and full spine to evaluate for intracranial hemorrhage, skull fracture, fracture of the spine.  Fortunately all imaging is negative.  This is consistent with contusion and we will discharge into family's care with rice instructions and return precautions.        9:20 AM I met with the patient, obtained history, performed an initial exam, and discussed options and plan for diagnostics and treatment here in the ED.  10:25 AM Checked in on and updated patient.         At the conclusion of the encounter I discussed the results of all of the tests and the disposition. The questions were answered. The patient or family acknowledged understanding and was agreeable with the care plan.         Medical Decision Making    History:    Supplemental history from: Family Member/Significant Other    External Record(s) reviewed: Outpatient Record: history     Work Up:    Chart documentation  includes differential considered and any EKGs or imaging independently interpreted by provider, where specified.    In additional to work up documented, I considered the following work up: Documented in chart, if applicable.    External consultation:    Discussion of management with another provider: Documented in chart, if applicable    Complicating factors:    Care impacted by chronic illness: Diabetes, Hyperlipidemia and Hypertension    Care affected by social determinants of health: N/A    Disposition considerations: Discharge. No recommendations on prescription strength medication(s). I considered admission, but ultimately discharged patient with negative imaging.      MEDICATIONS GIVEN IN THE EMERGENCY:   Medications - No data to display   NEW PRESCRIPTIONS STARTED AT TODAY'S ER VISIT   New Prescriptions    No medications on file     ================================================================   HISTORY OF PRESENT ILLNESS       Patient information was obtained from: Patient and patient's daughter    Use of Intrepreter: Yes (In Person) - Language Jose Yen is a 82 year old female with history of hyperlipidemia, diabetes mellitus type II, hypertension who presents for lower back pain after fall.     Per daughter: Two days ago (01/25/2023) patient slipped and fell on ice, landing on her buttock. Patient hit her head going down but did not have any loss of consciousness or vomiting. Over the next couple of days, daughter notes that patient has been moving slowly and had been having a hard time sitting up and down. Daughter is concerned about any injuries and brings patient here today to make sure nothing is broken. Patient is not on blood thinners.     Currently, patient endorses pain in her sacral area and the area where she hit the back of her head. Patient denies dizziness, numbness or tingling in extremities, loss of bowel or urinary function, chest pain, shortness of breath, neck pain, or any  "other complaints at this time.     ================================================================    REVIEW OF SYSTEMS       Review of Systems   Respiratory: Negative for shortness of breath.    Cardiovascular: Negative for chest pain.   Gastrointestinal: Negative for vomiting.   Genitourinary:        Denies issues with bowel or urinary function.    Musculoskeletal: Negative for neck pain.        Endorses pain in lower back and back of head.    Neurological: Negative for dizziness and numbness.        Denies loss of consciousness or tingling of extremities.    All other systems reviewed and are negative.      PAST HISTORY     PAST MEDICAL HISTORY:   No past medical history on file.   PAST SURGICAL HISTORY:   No past surgical history on file.   CURRENT MEDICATIONS:   blood glucose (NO BRAND SPECIFIED) lancets standard  blood glucose (NO BRAND SPECIFIED) test strip  empagliflozin (JARDIANCE) 25 MG TABS tablet  losartan (COZAAR) 50 MG tablet  metFORMIN (GLUCOPHAGE-XR) 750 MG 24 hr tablet  pioglitazone (ACTOS) 45 MG tablet  pregabalin (LYRICA) 50 MG capsule  simvastatin (ZOCOR) 20 MG tablet      ALLERGIES:   Allergies   Allergen Reactions     Penicillins Unknown     Doesn't remember reaction      FAMILY HISTORY:   No family history on file.   SOCIAL HISTORY:   Social History     Socioeconomic History     Marital status:    Tobacco Use     Smoking status: Never     Smokeless tobacco: Never   Vaping Use     Vaping Use: Never used        VITALS  Patient Vitals for the past 24 hrs:   BP Temp Temp src Pulse Resp SpO2 Height Weight   01/27/23 0914 (!) 152/67 97.3  F (36.3  C) Temporal 67 16 91 % 1.473 m (4' 10\") 58.1 kg (128 lb)        ================================================================    PHYSICAL EXAM     VITAL SIGNS: BP (!) 152/67   Pulse 67   Temp 97.3  F (36.3  C) (Temporal)   Resp 16   Ht 1.473 m (4' 10\")   Wt 58.1 kg (128 lb)   SpO2 91%   BMI 26.75 kg/m     Constitutional:  Awake, no acute " distress   HENT: Posterior scalp hematoma. oropharynx without exudate or erythema, membranes moist  Lymph:  No adenopathy  Eyes: EOM intact, PERRL, no injection  Neck: Supple  Respiratory:  Clear to auscultation bilaterally, no wheezes or crackles   Cardiovascular:  Regular rate and rhythm, single S1 and S2   GI:  Soft, nontender, nondistended, no rebound or guarding   Musculoskeletal:  Moves all extremities, no lower extremity edema, no deformities , no tenderness over the cervical or thoracic spine, tender without step-off or deformity over the lower lumbosacral musculature.  GCS 15.  Skin:  Warm, dry  Neurologic:  Alert and oriented x3, no focal deficits noted. No pronator drift.       ================================================================  LAB       All pertinent labs reviewed and interpreted.   Labs Ordered and Resulted from Time of ED Arrival to Time of ED Departure - No data to display     ===============================================================  RADIOLOGY       Reviewed all pertinent imaging. Please see official radiology report.   Lumbar spine CT w/o contrast   Final Result   IMPRESSION:   HEAD CT:   1.  No CT evidence for acute intracranial process.   2.  Brain atrophy and presumed chronic microvascular ischemic changes as above.      CERVICAL SPINE CT:   1.  No fracture or posttraumatic subluxation.   2.  Severe C7-T1 spinal canal stenosis largely secondary to a subligamentous disc herniation.      THORACIC SPINE CT:   1.  No fracture or posttraumatic subluxation.   2.  No high-grade spinal canal or neural foraminal stenosis.      LUMBAR SPINE CT:   1.  No acute fracture or posttraumatic subluxation.   2.  Chronic bilateral L5 pars defects associated with an 8 mm anterolisthesis and severe bilateral L5-S1 neural foraminal stenoses.      CT Thoracic Spine w/o Contrast   Final Result   IMPRESSION:   HEAD CT:   1.  No CT evidence for acute intracranial process.   2.  Brain atrophy and  presumed chronic microvascular ischemic changes as above.      CERVICAL SPINE CT:   1.  No fracture or posttraumatic subluxation.   2.  Severe C7-T1 spinal canal stenosis largely secondary to a subligamentous disc herniation.      THORACIC SPINE CT:   1.  No fracture or posttraumatic subluxation.   2.  No high-grade spinal canal or neural foraminal stenosis.      LUMBAR SPINE CT:   1.  No acute fracture or posttraumatic subluxation.   2.  Chronic bilateral L5 pars defects associated with an 8 mm anterolisthesis and severe bilateral L5-S1 neural foraminal stenoses.      Cervical spine CT w/o contrast   Final Result   IMPRESSION:   HEAD CT:   1.  No CT evidence for acute intracranial process.   2.  Brain atrophy and presumed chronic microvascular ischemic changes as above.      CERVICAL SPINE CT:   1.  No fracture or posttraumatic subluxation.   2.  Severe C7-T1 spinal canal stenosis largely secondary to a subligamentous disc herniation.      THORACIC SPINE CT:   1.  No fracture or posttraumatic subluxation.   2.  No high-grade spinal canal or neural foraminal stenosis.      LUMBAR SPINE CT:   1.  No acute fracture or posttraumatic subluxation.   2.  Chronic bilateral L5 pars defects associated with an 8 mm anterolisthesis and severe bilateral L5-S1 neural foraminal stenoses.      Head CT w/o contrast   Final Result   IMPRESSION:   HEAD CT:   1.  No CT evidence for acute intracranial process.   2.  Brain atrophy and presumed chronic microvascular ischemic changes as above.      CERVICAL SPINE CT:   1.  No fracture or posttraumatic subluxation.   2.  Severe C7-T1 spinal canal stenosis largely secondary to a subligamentous disc herniation.      THORACIC SPINE CT:   1.  No fracture or posttraumatic subluxation.   2.  No high-grade spinal canal or neural foraminal stenosis.      LUMBAR SPINE CT:   1.  No acute fracture or posttraumatic subluxation.   2.  Chronic bilateral L5 pars defects associated with an 8 mm  anterolisthesis and severe bilateral L5-S1 neural foraminal stenoses.            ================================================================  EKG         I have independently reviewed and interpreted the EKG(s) documented above.     ================================================================  PROCEDURES         I, Yenifer Matta, am serving as a scribe to document services personally performed by Dr. Westfall based on my observation and the provider's statements to me. I, Melinda Westfall MD attest that Yenifer Matta is acting in a scribe capacity, has observed my performance of the services and has documented them in accordance with my direction.     Melinda Westfall M.D.   Emergency Medicine   [facility] EMERGENCY ROOM  [address] [address]  [phone]  Dept: [phone]      Melinda Westfall MD  01/27/23 1031

## 2023-01-27 NOTE — ED TRIAGE NOTES
Pt presents after fall slipping on ice landing on buttock and head. Denies LOC or cervical tenderness.Not on any blood thinners. Ambulating independently     Triage Assessment     Row Name 01/27/23 0913       Triage Assessment (Adult)    Airway WDL WDL       Respiratory WDL    Respiratory WDL WDL       Skin Circulation/Temperature WDL    Skin Circulation/Temperature WDL WDL       Cardiac WDL    Cardiac WDL WDL       Peripheral/Neurovascular WDL    Peripheral Neurovascular WDL WDL       Cognitive/Neuro/Behavioral WDL    Cognitive/Neuro/Behavioral WDL WDL

## 2023-01-27 NOTE — DISCHARGE INSTRUCTIONS
Fortunately all of the imaging is negative for any fracture of the spine, head, or bleeding in the brain.  This is consistent with bruising and explains why she has been walking slower.  Use ice, give Tylenol, and it should improve over the next few days.

## 2023-02-07 ENCOUNTER — OFFICE VISIT (OUTPATIENT)
Dept: FAMILY MEDICINE | Facility: CLINIC | Age: 83
End: 2023-02-07
Payer: COMMERCIAL

## 2023-02-07 VITALS
HEART RATE: 62 BPM | SYSTOLIC BLOOD PRESSURE: 110 MMHG | DIASTOLIC BLOOD PRESSURE: 50 MMHG | WEIGHT: 127.3 LBS | BODY MASS INDEX: 26.61 KG/M2

## 2023-02-07 DIAGNOSIS — E11.40 TYPE 2 DIABETES MELLITUS WITH DIABETIC NEUROPATHY, WITHOUT LONG-TERM CURRENT USE OF INSULIN (H): Primary | ICD-10-CM

## 2023-02-07 DIAGNOSIS — M53.3 SACRAL BACK PAIN: ICD-10-CM

## 2023-02-07 DIAGNOSIS — E78.2 MIXED HYPERLIPIDEMIA: ICD-10-CM

## 2023-02-07 DIAGNOSIS — N18.31 CHRONIC KIDNEY DISEASE, STAGE 3A (H): ICD-10-CM

## 2023-02-07 LAB
ALBUMIN SERPL BCG-MCNC: 3.9 G/DL (ref 3.5–5.2)
ALP SERPL-CCNC: 154 U/L (ref 35–104)
ALT SERPL W P-5'-P-CCNC: 10 U/L (ref 10–35)
ANION GAP SERPL CALCULATED.3IONS-SCNC: 14 MMOL/L (ref 7–15)
AST SERPL W P-5'-P-CCNC: 20 U/L (ref 10–35)
BILIRUB SERPL-MCNC: 0.3 MG/DL
BUN SERPL-MCNC: 31.6 MG/DL (ref 8–23)
CALCIUM SERPL-MCNC: 9.2 MG/DL (ref 8.8–10.2)
CHLORIDE SERPL-SCNC: 101 MMOL/L (ref 98–107)
CHOLEST SERPL-MCNC: 208 MG/DL
CREAT SERPL-MCNC: 1.53 MG/DL (ref 0.51–0.95)
DEPRECATED HCO3 PLAS-SCNC: 21 MMOL/L (ref 22–29)
GFR SERPL CREATININE-BSD FRML MDRD: 33 ML/MIN/1.73M2
GLUCOSE SERPL-MCNC: 317 MG/DL (ref 70–99)
HBA1C MFR BLD: 10.3 % (ref 0–5.6)
HDLC SERPL-MCNC: 33 MG/DL
LDLC SERPL CALC-MCNC: 120 MG/DL
NONHDLC SERPL-MCNC: 175 MG/DL
POTASSIUM SERPL-SCNC: 4.5 MMOL/L (ref 3.4–5.3)
PROT SERPL-MCNC: 7.1 G/DL (ref 6.4–8.3)
SODIUM SERPL-SCNC: 136 MMOL/L (ref 136–145)
TRIGL SERPL-MCNC: 276 MG/DL

## 2023-02-07 PROCEDURE — 83036 HEMOGLOBIN GLYCOSYLATED A1C: CPT | Performed by: NURSE PRACTITIONER

## 2023-02-07 PROCEDURE — 36415 COLL VENOUS BLD VENIPUNCTURE: CPT | Performed by: NURSE PRACTITIONER

## 2023-02-07 PROCEDURE — 80061 LIPID PANEL: CPT | Performed by: NURSE PRACTITIONER

## 2023-02-07 PROCEDURE — 80053 COMPREHEN METABOLIC PANEL: CPT | Performed by: NURSE PRACTITIONER

## 2023-02-07 PROCEDURE — 99214 OFFICE O/P EST MOD 30 MIN: CPT | Performed by: NURSE PRACTITIONER

## 2023-02-07 RX ORDER — METFORMIN HYDROCHLORIDE 750 MG/1
1500 TABLET, EXTENDED RELEASE ORAL DAILY
Qty: 180 TABLET | Refills: 3 | Status: SHIPPED | OUTPATIENT
Start: 2023-02-07 | End: 2023-02-10

## 2023-02-07 NOTE — PATIENT INSTRUCTIONS
Take 1000 mg of Tylenol (Acetaminophen) 3 times per day.  Please avoid any NSAIDs - Aleve, Ibuprofen, Naproxen  Get some Lidocaine patches for the low back to place there.  Continue heat.    If you back pain is not improving in the next month, please let me know and we can refer you to our spine specialist    Increase Metformin to 2 tablets once daily.

## 2023-02-07 NOTE — PROGRESS NOTES
"  Assessment & Plan     Type 2 diabetes mellitus with diabetic neuropathy, without long-term current use of insulin (H)  Suboptimal control.  Hgb A1c is elevated at 10.3.  I recommend increasing Metformin XR to 1500 mg daily.  Continue Jardiance and Actos.   - Hemoglobin A1c  - metFORMIN (GLUCOPHAGE-XR) 750 MG 24 hr tablet  Dispense: 180 tablet; Refill: 3    Mixed hyperlipidemia  Recently increased dose of Simvastatin.   - Lipid panel reflex to direct LDL Non-fasting  - Comprehensive metabolic panel (BMP + Alb, Alk Phos, ALT, AST, Total. Bili, TP)    Chronic kidney disease, stage 3a (H)    Sacral back pain  Reviewed CT scans from ED.  Her pain seems to be primarily to her sacrum.  Dicussed that this may take several week to dully resolve.  Recommend Tylenol 1000 mg TID.  Advised against NSAIDs due to CKD.  Recommend sitting on a cushion, heat, and possible Lidocaine patches OTC.  She has some chronic issues within her CT scans.  If her back pain is worsening or not improving in the next month, I would recommend consult with Spine.          BMI:   Estimated body mass index is 26.61 kg/m  as calculated from the following:    Height as of 1/27/23: 1.473 m (4' 10\").    Weight as of this encounter: 57.7 kg (127 lb 4.8 oz).     Return in about 6 months (around 8/7/2023).    Faith Genao NP  [facility]   Tracie is a 82 year old accompanied by her granddaughter who presents for a diabetic check.  Last Hgb A1c was 9.5.  Patient met with diabetic education and they had recommended increasing her metformin.  Patient did not want to do this.  She is checking her blood sugars a couple times a week, but does not have any readings for me today.  At her last visit 3 months ago, we had increased her simvastatin.  She denies any new side effects.    Patient was seen in the ER about 10 days ago after a fall.  She had imaging completed including a CT of the head and of the full spine.  " There were no acute findings.  I reviewed her CT today, which also showed some severe spinal stenosis with disc herniation to the cervical spine.  There also showed some severe foraminal stenosis to the lumbar spine.  Patient denies any upper extremity weakness or paresthesias.  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  She is on Lyrica for this.  Patient continues to still have pain, primarily in the low back/buttocks area.  Pain is worse with walking.  She generally just feels very sore and achy.  She is using heat, NSAIDs, and essential oil patches.  She denies any radicular pain or subsequent falls.  The pain does not seem to be getting worse, but not significantly improving.    Diabetes Follow-up    How often are you checking your blood sugar? Three times per [facility]  Blood sugar testing frequency justification:  Uncontrolled diabetes  What time of day are you checking your blood sugars (select all that apply)?  Before and after meals  Have you had any blood sugars above 200?  Yes   Have you had any blood sugars below 70?  No    What symptoms do you notice when your blood sugar is low?  None    What concerns do you have today about your diabetes? None     Do you have any of these symptoms? (Select all that apply)  Numbness in feet    Have you had a diabetic eye exam in the last 12 months? No        BP Readings from Last 2 Encounters:   02/07/23 110/50   01/27/23 (!) 152/67     Hemoglobin A1C (%)   Date Value   02/07/2023 10.3 (H)   11/18/2022 9.5 (H)     LDL Cholesterol Calculated (mg/dL)   Date Value   11/18/2022 109 (H)     LDL Cholesterol Direct (mg/dL)   Date Value   01/20/2014 97   11/11/2013 107           How many servings of fruits and vegetables do you eat daily?  2-3    On average, how many sweetened beverages do you drink each day (Examples: soda, juice, sweet tea, etc.  Do NOT count diet or artificially sweetened beverages)?    0    How many days per week do you exercise enough to make your heart beat faster? 4    How many minutes a day do you exercise enough to make your heart beat faster? 10 - 19    How many days per week do you miss taking your medication? 0      Review of Systems   Pertinent items in HPI      Objective    /50   Pulse 62   Wt 57.7 kg (127 lb 4.8 oz)   BMI 26.61 kg/m    Body mass index is 26.61 kg/m .  Physical Exam   GENERAL: healthy, alert and no distress  RESP: lungs clear to auscultation - no rales, rhonchi or wheezes  CV: regular rate and rhythm, normal S1 S2, no S3 or S4, no murmur, click or rub, no peripheral edema  BACK: Pain palpated over the sacrum.  Lumbar spine is nontender.  Gait is normal.  Lower extremity strength equal bilaterally.

## 2023-02-10 ENCOUNTER — PATIENT OUTREACH (OUTPATIENT)
Dept: GERIATRIC MEDICINE | Facility: CLINIC | Age: 83
End: 2023-02-10
Payer: COMMERCIAL

## 2023-02-10 RX ORDER — METFORMIN HYDROCHLORIDE 750 MG/1
750 TABLET, EXTENDED RELEASE ORAL DAILY
Qty: 90 TABLET | Refills: 3 | Status: SHIPPED | OUTPATIENT
Start: 2023-02-10 | End: 2024-04-23

## 2023-02-10 NOTE — PROGRESS NOTES
[facility] [place] Care Coordination Contact    Member became effective with  [facility] on 02/01/2023 with [facility].  Previous Health Plan: [facility]  Previous Care System: [facility]  Previous care coordinators name and number: Erin Vanessa Viera Type: EW  Last MMIS Entry: Date 09- and Type 06 re-assmnt  MMIS visit date (and type) if different from above: N/A   Services Listed in MMIS:   UTF received: No: Requested on email [facility] for [facility]  Address/Phone discrepancy:     1. Please verified member address, what is listed in Epic ( does not match what listed in [facility] eligibility.     2. Member name listed in Epic Tracie Yen  -lianna Yen May (no middle initital.name)    -[facility] Hospital[facility]astasi[facility] May KYRA Soto  Care Management Specialist  [facility] [place]  [phone]

## 2023-02-10 NOTE — LETTER
February 10, 2023    TRACIE RAF  [address] [address]      Dear Tracie,    Welcome to [facility] health program. My name is Jenn Tena RN. I am your [facility] care coordinator. You're eligible for care coordination through your [facility] Product.    As your care coordinator, we ll:    Meet to go over your care coordination benefits    Talk about your physical and mental health care needs     Review your preventative care needs    Create a plan that meets your needs with the services you choose    What happens next?  I ll call you soon to introduce myself and tell you more about my role. We ll then plan time to go over your health and safety needs. Our goal is to keep you as healthy and independent as possible.    [facility] combines the benefits you may already have receive from Medical Assistance, Medicare and the Prescription Drug Coverage Program. Soon you'll receive a new member identification (ID) card from [facility]. Use this card whenever you get health services.    The [facility] care coordination program is voluntary and offered to you at no cost. If you wish to stop being in the care coordination program or have questions, call me at [phone]. If you reach my voicemail, leave a message and your phone number. TTY users, call the [place] Relay at 142 or [phone] (uwgnqv-xe-rpvjrk relay service).    Sincerely,      Jenn Tena RN, N  [phone]  [email or web address]) is a health plan that contracts with both Medicare and the [place] Medical Assistance (Medicaid) program to provide benefits of both programs to enrollees. Enrollment in [facility] depends on contract renewal.    [id number] accepted   [id number]         [id number] (07/2022)

## 2023-02-16 ENCOUNTER — PATIENT OUTREACH (OUTPATIENT)
Dept: GERIATRIC MEDICINE | Facility: CLINIC | Age: 83
End: 2023-02-16
Payer: COMMERCIAL

## 2023-02-16 NOTE — PROGRESS NOTES
[facility] Care Coordination Contact    [facility] System Change (Transfer)    Member is new enrollee to [facility] effective 2/1/23 with [facility]. Member transferred from [facility].    No home visit required because this care coordinator (CC) has received all required documentation from the previous CC.    Writer t/c to member, introduced self as member's new CC. Confirmed with member that the welcome letter with writer's name and contact information has been received.  Reviewed [facility]CC/Health Risk Assessment (HRA) and POC with member. No changes noted.  Transitional HRA completed. Care Plan Summary updated and reflects current services.  Required referral authorization information communicated to CMS: Not applicable    Writer reviewed the following with member:    ER visits: Yes -  1/2023 s/p fall  Hospitalizations: No  TCU stays: No  Significant health status changes: None reported   Falls/Injuries: Yes: 1/2023 outside, was seen in ED with no injuries.  Falls prevention reviewed with family.   ADL/IADL changes: No  Changes in services: No    Follow-Up Plan: Member informed of future contact, plan to f/u with member with at next regularly scheduled contact.  Contact information shared with member and family, encouraged member to call with any questions or concerns.    Jenn Tena RN PHN  [facility]  Care Coordinator  [phone]

## 2023-05-16 ENCOUNTER — OFFICE VISIT (OUTPATIENT)
Dept: FAMILY MEDICINE | Facility: CLINIC | Age: 83
End: 2023-05-16
Payer: COMMERCIAL

## 2023-05-16 VITALS
BODY MASS INDEX: 26.54 KG/M2 | HEART RATE: 67 BPM | WEIGHT: 127 LBS | SYSTOLIC BLOOD PRESSURE: 138 MMHG | TEMPERATURE: 96.8 F | OXYGEN SATURATION: 93 % | DIASTOLIC BLOOD PRESSURE: 46 MMHG

## 2023-05-16 DIAGNOSIS — N18.32 CHRONIC KIDNEY DISEASE, STAGE 3B (H): ICD-10-CM

## 2023-05-16 DIAGNOSIS — E11.40 TYPE 2 DIABETES MELLITUS WITH DIABETIC NEUROPATHY, WITHOUT LONG-TERM CURRENT USE OF INSULIN (H): Primary | ICD-10-CM

## 2023-05-16 LAB
ALBUMIN UR-MCNC: NEGATIVE MG/DL
APPEARANCE UR: ABNORMAL
BACTERIA #/AREA URNS HPF: ABNORMAL /HPF
BILIRUB UR QL STRIP: NEGATIVE
COLOR UR AUTO: YELLOW
GLUCOSE UR STRIP-MCNC: >=1000 MG/DL
HBA1C MFR BLD: 9.6 % (ref 0–5.6)
HGB BLD-MCNC: 12.2 G/DL (ref 11.7–15.7)
HGB UR QL STRIP: ABNORMAL
KETONES UR STRIP-MCNC: NEGATIVE MG/DL
LEUKOCYTE ESTERASE UR QL STRIP: NEGATIVE
NITRATE UR QL: POSITIVE
PH UR STRIP: 5.5 [PH] (ref 5–8)
RBC #/AREA URNS AUTO: ABNORMAL /HPF
SP GR UR STRIP: <=1.005 (ref 1–1.03)
SQUAMOUS #/AREA URNS AUTO: ABNORMAL /LPF
UROBILINOGEN UR STRIP-ACNC: 0.2 E.U./DL
WBC #/AREA URNS AUTO: ABNORMAL /HPF

## 2023-05-16 PROCEDURE — 83036 HEMOGLOBIN GLYCOSYLATED A1C: CPT | Performed by: NURSE PRACTITIONER

## 2023-05-16 PROCEDURE — 87086 URINE CULTURE/COLONY COUNT: CPT | Performed by: NURSE PRACTITIONER

## 2023-05-16 PROCEDURE — 99214 OFFICE O/P EST MOD 30 MIN: CPT | Performed by: NURSE PRACTITIONER

## 2023-05-16 PROCEDURE — 36415 COLL VENOUS BLD VENIPUNCTURE: CPT | Performed by: NURSE PRACTITIONER

## 2023-05-16 PROCEDURE — 81001 URINALYSIS AUTO W/SCOPE: CPT | Performed by: NURSE PRACTITIONER

## 2023-05-16 PROCEDURE — 80048 BASIC METABOLIC PNL TOTAL CA: CPT | Performed by: NURSE PRACTITIONER

## 2023-05-16 PROCEDURE — 85018 HEMOGLOBIN: CPT | Performed by: NURSE PRACTITIONER

## 2023-05-16 RX ORDER — SIMVASTATIN 40 MG
40 TABLET ORAL AT BEDTIME
COMMUNITY
Start: 2023-02-21 | End: 2023-05-16

## 2023-05-16 RX ORDER — LANCETS 33 GAUGE
EACH MISCELLANEOUS
COMMUNITY
Start: 2023-02-12 | End: 2024-02-19

## 2023-05-16 NOTE — PROGRESS NOTES
"  Assessment & Plan     Type 2 diabetes mellitus with diabetic neuropathy, without long-term current use of insulin (H)  Hgb A1c suboptimally controlled at 9.6.  Patient does not wish to start any injectable medications.  Consulted with MTM, who recommend adding Tradjenta once daily with her other medications.  This does not need to be renally dosed.  Follow up in 6 months as planned.   - Hemoglobin A1c  - linagliptin (TRADJENTA) 5 MG TABS tablet  Dispense: 90 tablet; Refill: 3    Chronic kidney disease, stage 3b (H)  GFR improved to 41.  On ARB.  - Hemoglobin  - UA with Microscopic reflex to Culture - lab collect  - Basic metabolic panel  - Urine Microscopic Exam  - Urine Culture         BMI:   Estimated body mass index is 26.54 kg/m  as calculated from the following:    Height as of 1/27/23: 1.473 m (4' 10\").    Weight as of this encounter: 57.6 kg (127 lb).       Faith Genao NP  [facility]    Giulia Hodges is a 82 year old accompanied by her daughter (Wes) who presents for a diabetic check.  Daughter is interpreting and patient declines a professional .    History of type 2 diabetes.  She is on metformin, Actos, and Jardiance.  Her last hemoglobin A1c was 10.2 and we added Januvia.  Patient never started this medication.  Fasting blood sugars range from 120-200 depending on her diet.  Were unable to increase Metformin at last visit due to GFR of 33.  Has seen MTM in the past.  Does not wish to do an injectable.    Patient is scheduled for an eye exam.  She has recently had some vision changed.      Review of Systems   Pertinent items in HPI        Objective    /46 (BP Location: Left arm, Patient Position: Sitting, Cuff Size: Adult Regular)   Pulse 67   Temp 96.8  F (36  C) (Temporal)   Wt 57.6 kg (127 lb)   SpO2 93%   BMI 26.54 kg/m    Body mass index is 26.54 kg/m .  Physical Exam   GENERAL: healthy, alert and no distress  RESP: lungs clear to " auscultation - no rales, rhonchi or wheezes  CV: regular rate and rhythm, normal S1 S2, no S3 or S4, no murmur, click or rub, no peripheral edema

## 2023-05-17 ENCOUNTER — TELEPHONE (OUTPATIENT)
Dept: FAMILY MEDICINE | Facility: CLINIC | Age: 83
End: 2023-05-17
Payer: COMMERCIAL

## 2023-05-17 LAB
ANION GAP SERPL CALCULATED.3IONS-SCNC: 12 MMOL/L (ref 7–15)
BUN SERPL-MCNC: 36.6 MG/DL (ref 8–23)
CALCIUM SERPL-MCNC: 9.2 MG/DL (ref 8.8–10.2)
CHLORIDE SERPL-SCNC: 102 MMOL/L (ref 98–107)
CREAT SERPL-MCNC: 1.29 MG/DL (ref 0.51–0.95)
DEPRECATED HCO3 PLAS-SCNC: 22 MMOL/L (ref 22–29)
GFR SERPL CREATININE-BSD FRML MDRD: 41 ML/MIN/1.73M2
GLUCOSE SERPL-MCNC: 324 MG/DL (ref 70–99)
POTASSIUM SERPL-SCNC: 5.2 MMOL/L (ref 3.4–5.3)
SODIUM SERPL-SCNC: 136 MMOL/L (ref 136–145)

## 2023-05-17 NOTE — TELEPHONE ENCOUNTER
----- Message from Faith Genao NP sent at 5/17/2023  8:44 AM CDT -----  Please call daughter (Wes).    Tracie's diabetes appears not well controlled.  I would like to add an additional oral medication call Tradjenta.  She will take this once daily with all her other medications.  I have sent this to the pharmacy.    No changes in any of her other medications.  Keep follow up in 6 months as scheduled.    Faith Genao NP

## 2023-05-17 NOTE — TELEPHONE ENCOUNTER
LMTCB. Please relay Faith's message below when daughter returns call. Consent to communicate on file.

## 2023-05-18 LAB — BACTERIA UR CULT: NORMAL

## 2023-05-18 NOTE — TELEPHONE ENCOUNTER
05/18/23  Received call back from [facility]. Relayed message.  Samantha

## 2023-06-01 DIAGNOSIS — G62.9 PERIPHERAL POLYNEUROPATHY: ICD-10-CM

## 2023-06-02 RX ORDER — PREGABALIN 50 MG/1
CAPSULE ORAL
Qty: 90 CAPSULE | Refills: 0 | Status: SHIPPED | OUTPATIENT
Start: 2023-06-02 | End: 2023-08-28

## 2023-06-29 ENCOUNTER — PATIENT OUTREACH (OUTPATIENT)
Dept: GERIATRIC MEDICINE | Facility: CLINIC | Age: 83
End: 2023-06-29
Payer: COMMERCIAL

## 2023-06-29 NOTE — PROGRESS NOTES
[facility] Care Coordination Contact    Called adult daughter Wes to schedule annual HRA home visit. HRA has been scheduled for 7/12/2023.

## 2023-07-01 ENCOUNTER — TRANSFERRED RECORDS (OUTPATIENT)
Dept: MULTI SPECIALTY CLINIC | Facility: CLINIC | Age: 83
End: 2023-07-01

## 2023-07-01 LAB — RETINOPATHY: NORMAL

## 2023-07-12 ENCOUNTER — PATIENT OUTREACH (OUTPATIENT)
Dept: GERIATRIC MEDICINE | Facility: CLINIC | Age: 83
End: 2023-07-12
Payer: COMMERCIAL

## 2023-07-12 SDOH — ECONOMIC STABILITY: TRANSPORTATION INSECURITY
IN THE PAST 12 MONTHS, HAS THE LACK OF TRANSPORTATION KEPT YOU FROM MEDICAL APPOINTMENTS OR FROM GETTING MEDICATIONS?: NO

## 2023-07-12 SDOH — ECONOMIC STABILITY: TRANSPORTATION INSECURITY
IN THE PAST 12 MONTHS, HAS LACK OF TRANSPORTATION KEPT YOU FROM MEETINGS, WORK, OR FROM GETTING THINGS NEEDED FOR DAILY LIVING?: NO

## 2023-07-12 SDOH — ECONOMIC STABILITY: FOOD INSECURITY: WITHIN THE PAST 12 MONTHS, THE FOOD YOU BOUGHT JUST DIDN'T LAST AND YOU DIDN'T HAVE MONEY TO GET MORE.: NEVER TRUE

## 2023-07-12 SDOH — ECONOMIC STABILITY: FOOD INSECURITY: WITHIN THE PAST 12 MONTHS, YOU WORRIED THAT YOUR FOOD WOULD RUN OUT BEFORE YOU GOT MONEY TO BUY MORE.: NEVER TRUE

## 2023-07-12 ASSESSMENT — LIFESTYLE VARIABLES
HOW MANY STANDARD DRINKS CONTAINING ALCOHOL DO YOU HAVE ON A TYPICAL DAY: PATIENT DOES NOT DRINK
AUDIT-C TOTAL SCORE: 0
HOW OFTEN DO YOU HAVE A DRINK CONTAINING ALCOHOL: NEVER
HOW OFTEN DO YOU HAVE SIX OR MORE DRINKS ON ONE OCCASION: NEVER
SKIP TO QUESTIONS 9-10: 1

## 2023-07-12 ASSESSMENT — SOCIAL DETERMINANTS OF HEALTH (SDOH): HOW HARD IS IT FOR YOU TO PAY FOR THE VERY BASICS LIKE FOOD, HOUSING, MEDICAL CARE, AND HEATING?: NOT HARD AT ALL

## 2023-07-12 ASSESSMENT — ACTIVITIES OF DAILY LIVING (ADL)
DEPENDENT_IADLS:: CLEANING;COOKING;LAUNDRY;SHOPPING;MEAL PREPARATION;MEDICATION MANAGEMENT;MONEY MANAGEMENT;TRANSPORTATION;INCONTINENCE

## 2023-07-12 NOTE — Clinical Note
Home visit completed for annual health risk and PCA reassessment.   Member lives with family who are involved in daily cares.   Attends adult day care 4x/week.  Never left home alone.   Family help manage and admin meds daily. No new fall since 1/2023.    Jenn Tena RN [facility] Care Coordinator [phone]

## 2023-07-12 NOTE — PROGRESS NOTES
[facility] Care Coordination Contact    [facility] Home Visit Assessment     Home visit for Health Risk Assessment with Tracie Yen completed on July 12, 2023    Type of residence:: Private home - stairs  Current living arrangement:: I live in a private home with family     Assessment completed with:: Patient, Children    Current Care Plan  Member currently receiving the following home care services:     Member currently receiving the following community resources: PCA, Day Care      Medication Review  Medication reconciliation completed in Epic: Yes  Medication set-up & administration: Family/informal caregiver sets up weekly.  Family caregiver administers medications.  Medication Risk Assessment Medication (1 or more, place referral to MTM): N/A: No risk factors identified  MTM Referral Placed: No: No risk factors idenified    Mental/Behavioral Health   Depression Screening:   PHQ-2 Total Score (Adult) - Positive if 3 or more points; Administer PHQ-9 if positive: 1       Mental health DX:: No        Falls Assessment:   Fallen 2 or more times in the past year?: No   Any fall with injury in the past year?: No    ADL/IADL Dependencies:   Dependent ADLs:: Ambulation-cane, Bathing, Dressing, Eating, Incontinence, Positioning, Transfers, Toileting  Dependent IADLs:: Cleaning, Cooking, Laundry, Shopping, Meal Preparation, Medication Management, Money Management, Transportation, Incontinence    [facility] sponsored benefits: Shared information re: Silver Sneakers/gym memberships, ASA, Calcium +D.    PCA Assessment completed at visit: Yes Annual PCA assessment indicated 4 hours per day of PCA. This is an increase from the previous assessment.     Elderly Waiver Eligibility: Yes-will continue on EW    Care Plan & Recommendations: Increase PCA and reduce hmking from 7hrs/week to 2.25hrs/week per [employer] guidelines.  Appeal rights reviewed with family and member.  Member lives with family who also provide  IADL assist.  No new fall reported since 1/2023.  Falls prevention discussed.   Member is [place] and received bilateral hearing aides 2022.  She states she lost right hearing aide.  Care coordinator will check with are if they would able to replace it, however it is usually covered once every five years.       See [facility] for detailed assessment information.    Follow-Up Plan: Member informed of future contact, plan to f/u with member with a 6 month telephone assessment.  Contact information shared with member and family, encouraged member to call with any questions or concerns at any time.    [place] care continuum providers: Please see Snapshot and Care Management Flowsheets for Specific details of care plan.    This CC note routed to PCP, Faith Genao.    Jenn Tena RN PHN  [facility]  Care Coordinator  [phone]

## 2023-07-14 ENCOUNTER — PATIENT OUTREACH (OUTPATIENT)
Dept: GERIATRIC MEDICINE | Facility: CLINIC | Age: 83
End: 2023-07-14
Payer: COMMERCIAL

## 2023-07-14 NOTE — LETTER
July 14, 2023      FELICITA CARBONE  [address] [address]      Dear Felicita:    At [facility], we re dedicated to improving your health and wellness. Enclosed is the Care Plan developed with you on 07/14/2023. Please review the Care Plan carefully.    As a reminder, during your visit we talked about:  Ways to manage your physical and mental health  Using health care to maintain and improve your health   Your preventive care needs     Remember to contact your care coordinator if you:  Are hospitalized, or plan to be hospitalized   Have a fall    Have a change in your physical or mental health  Need help finding support or services    If you have questions, or don t agree with your Care Plan, call me at [phone]. You can also call me if your needs change. TTY users, call the [place] [employer] at (880) or 1-787.640.2682 (tingwg-bc-mxvvmp relay service).    Sincerely,      Jenn Tena RN, PHN  [phone]  [email or web address] is a health plan that contracts with both Medicare and the [place] Medical Assistance (Medicaid) program to provide benefits of both programs to enrollees. Enrollment in [facility] depends on contract renewal.    [id number] accepted    [id number] (07/2022)

## 2023-07-14 NOTE — PROGRESS NOTES
[facility] Care Coordination Contact    Received after visit chart from care coordinator.  Completed following tasks: Mailed copy of care plan to client, Mailed Safe Medication Disposal  and Updated services in Database  , Provider Signature - No POC Shared:  Member indicates that they do not want their POC shared with any EW providers.  Submit Auth for ADC and transportation  To [facility]    [facility]:  Emailed completed PCA assessment to [facility].  Faxed copy of PCA assessment to PCA Agency and mailed copy to member.  Faxed MD Communication to PCP.     Dea Soto  Care Management Specialist  [facility]  [phone]

## 2023-07-18 ENCOUNTER — PATIENT OUTREACH (OUTPATIENT)
Dept: GERIATRIC MEDICINE | Facility: CLINIC | Age: 83
End: 2023-07-18
Payer: COMMERCIAL

## 2023-07-18 NOTE — PROGRESS NOTES
[facility] Care Coordination Contact    Received a request to submit a DTR for the reduced of Homemaking. Documentation completed and faxed to the health plan. Care Coordinator aware.    Laureen Arango RN  Utilization   [facility]  [phone]

## 2023-08-15 ENCOUNTER — PATIENT OUTREACH (OUTPATIENT)
Dept: GERIATRIC MEDICINE | Facility: CLINIC | Age: 83
End: 2023-08-15
Payer: COMMERCIAL

## 2023-08-15 NOTE — PROGRESS NOTES
[facility] Care Coordination Contact    Care coordinator spoke with Taz from [facility] [phone] in regards to services.  Care coordinator explained PCA increased to 4hrs/day but because member lives with family, hmking was reduced from 7hrs/day to 2.25hrs/day per Ucare guidelines.       Jenn Tena RN PHN  [facility]  Care Coordinator  [phone]

## 2023-08-28 DIAGNOSIS — G62.9 PERIPHERAL POLYNEUROPATHY: ICD-10-CM

## 2023-08-28 RX ORDER — PREGABALIN 50 MG/1
CAPSULE ORAL
Qty: 90 CAPSULE | Refills: 0 | Status: SHIPPED | OUTPATIENT
Start: 2023-08-28 | End: 2023-11-20

## 2023-09-01 ENCOUNTER — PATIENT OUTREACH (OUTPATIENT)
Dept: GERIATRIC MEDICINE | Facility: CLINIC | Age: 83
End: 2023-09-01
Payer: COMMERCIAL

## 2023-09-01 NOTE — PROGRESS NOTES
[facility] Care Coordination Contact     Phone call with Lisa eYn from [facility] [phone] stating they have not received auth for [facility] services.  Email sent to Sulema to re-connect with Yuli as DTR was submitting to her back in July 2023 for decrease from 5hrs/week to 2.25hrs/week.    Care coordinator received voice mail from Pham GIRARD from [facility] [phone] with member request to change pulls and size.   Per discussion with family and member during annual hra in July, they were using [facility] and picking up pulls up.   Care coordinator offered to change vendor with monthly auto order and they declined.      Care coordinator spoke with member's daughter Wes and she will discuss changes with pharmacy.   Care coordinator left voice mail updating Pham.       Jenn Tena, RN PHN  [facility]  Care Coordinator  [phone]

## 2023-10-11 ENCOUNTER — OFFICE VISIT (OUTPATIENT)
Dept: FAMILY MEDICINE | Facility: CLINIC | Age: 83
End: 2023-10-11
Payer: COMMERCIAL

## 2023-10-11 VITALS
DIASTOLIC BLOOD PRESSURE: 56 MMHG | OXYGEN SATURATION: 100 % | SYSTOLIC BLOOD PRESSURE: 120 MMHG | TEMPERATURE: 97.1 F | WEIGHT: 127 LBS | BODY MASS INDEX: 26.54 KG/M2 | HEART RATE: 66 BPM | RESPIRATION RATE: 14 BRPM

## 2023-10-11 DIAGNOSIS — E11.40 TYPE 2 DIABETES MELLITUS WITH DIABETIC NEUROPATHY, WITHOUT LONG-TERM CURRENT USE OF INSULIN (H): Primary | ICD-10-CM

## 2023-10-11 DIAGNOSIS — L20.9 ATOPIC DERMATITIS, UNSPECIFIED TYPE: ICD-10-CM

## 2023-10-11 DIAGNOSIS — L29.9 GENERALIZED PRURITUS: ICD-10-CM

## 2023-10-11 LAB
ALBUMIN SERPL BCG-MCNC: 4.2 G/DL (ref 3.5–5.2)
ALP SERPL-CCNC: 94 U/L (ref 35–104)
ALT SERPL W P-5'-P-CCNC: 11 U/L (ref 0–50)
ANION GAP SERPL CALCULATED.3IONS-SCNC: 10 MMOL/L (ref 7–15)
AST SERPL W P-5'-P-CCNC: 19 U/L (ref 0–45)
BILIRUB SERPL-MCNC: 0.3 MG/DL
BUN SERPL-MCNC: 36.7 MG/DL (ref 8–23)
CALCIUM SERPL-MCNC: 9.5 MG/DL (ref 8.8–10.2)
CHLORIDE SERPL-SCNC: 105 MMOL/L (ref 98–107)
CREAT SERPL-MCNC: 1.47 MG/DL (ref 0.51–0.95)
CREAT UR-MCNC: 44.7 MG/DL
DEPRECATED HCO3 PLAS-SCNC: 25 MMOL/L (ref 22–29)
EGFRCR SERPLBLD CKD-EPI 2021: 35 ML/MIN/1.73M2
ERYTHROCYTE [DISTWIDTH] IN BLOOD BY AUTOMATED COUNT: 13.5 % (ref 10–15)
GLUCOSE SERPL-MCNC: 211 MG/DL (ref 70–99)
HBA1C MFR BLD: 8.4 % (ref 0–5.6)
HCT VFR BLD AUTO: 39.2 % (ref 35–47)
HGB BLD-MCNC: 12.4 G/DL (ref 11.7–15.7)
MCH RBC QN AUTO: 27.6 PG (ref 26.5–33)
MCHC RBC AUTO-ENTMCNC: 31.6 G/DL (ref 31.5–36.5)
MCV RBC AUTO: 87 FL (ref 78–100)
MICROALBUMIN UR-MCNC: <12 MG/L
MICROALBUMIN/CREAT UR: NORMAL MG/G{CREAT}
PLATELET # BLD AUTO: 243 10E3/UL (ref 150–450)
POTASSIUM SERPL-SCNC: 5 MMOL/L (ref 3.4–5.3)
PROT SERPL-MCNC: 7.4 G/DL (ref 6.4–8.3)
RBC # BLD AUTO: 4.5 10E6/UL (ref 3.8–5.2)
SODIUM SERPL-SCNC: 140 MMOL/L (ref 135–145)
TSH SERPL DL<=0.005 MIU/L-ACNC: 2.38 UIU/ML (ref 0.3–4.2)
WBC # BLD AUTO: 8.6 10E3/UL (ref 4–11)

## 2023-10-11 PROCEDURE — 84443 ASSAY THYROID STIM HORMONE: CPT | Performed by: NURSE PRACTITIONER

## 2023-10-11 PROCEDURE — 99207 PR FOOT EXAM NO CHARGE: CPT | Performed by: NURSE PRACTITIONER

## 2023-10-11 PROCEDURE — 83036 HEMOGLOBIN GLYCOSYLATED A1C: CPT | Performed by: NURSE PRACTITIONER

## 2023-10-11 PROCEDURE — 90662 IIV NO PRSV INCREASED AG IM: CPT | Performed by: NURSE PRACTITIONER

## 2023-10-11 PROCEDURE — 85027 COMPLETE CBC AUTOMATED: CPT | Performed by: NURSE PRACTITIONER

## 2023-10-11 PROCEDURE — G0008 ADMIN INFLUENZA VIRUS VAC: HCPCS | Performed by: NURSE PRACTITIONER

## 2023-10-11 PROCEDURE — 80053 COMPREHEN METABOLIC PANEL: CPT | Performed by: NURSE PRACTITIONER

## 2023-10-11 PROCEDURE — 99214 OFFICE O/P EST MOD 30 MIN: CPT | Mod: 25 | Performed by: NURSE PRACTITIONER

## 2023-10-11 PROCEDURE — 82043 UR ALBUMIN QUANTITATIVE: CPT | Performed by: NURSE PRACTITIONER

## 2023-10-11 PROCEDURE — 82570 ASSAY OF URINE CREATININE: CPT | Performed by: NURSE PRACTITIONER

## 2023-10-11 PROCEDURE — 36415 COLL VENOUS BLD VENIPUNCTURE: CPT | Performed by: NURSE PRACTITIONER

## 2023-10-11 RX ORDER — CHLORAL HYDRATE 500 MG
1 CAPSULE ORAL 2 TIMES DAILY
COMMUNITY
Start: 2023-09-22

## 2023-10-11 RX ORDER — CLOBETASOL PROPIONATE 0.5 MG/G
CREAM TOPICAL 2 TIMES DAILY
Qty: 45 G | Refills: 1 | Status: SHIPPED | OUTPATIENT
Start: 2023-10-11

## 2023-10-11 RX ORDER — SIMVASTATIN 40 MG
40 TABLET ORAL AT BEDTIME
COMMUNITY
Start: 2023-08-15 | End: 2024-04-23

## 2023-10-11 RX ORDER — CYCLOSPORINE 0.5 MG/ML
1 EMULSION OPHTHALMIC 2 TIMES DAILY
COMMUNITY
Start: 2023-09-09

## 2023-10-11 RX ORDER — GLYCERIN .002; .002; .01 MG/MG; MG/MG; MG/MG
SOLUTION/ DROPS OPHTHALMIC
COMMUNITY
Start: 2023-08-28

## 2023-10-11 RX ORDER — PREDNISOLONE ACETATE 10 MG/ML
SUSPENSION/ DROPS OPHTHALMIC
COMMUNITY
Start: 2023-08-15 | End: 2024-04-23

## 2023-10-11 NOTE — PROGRESS NOTES
"  Assessment & Plan     Type 2 diabetes mellitus with diabetic neuropathy, without long-term current use of insulin (H)  Hgb A1c improved at 8.4.  On maximum doses of all diabetic medications.  No further changes made at this time.  Follow up in 6 months.   - Hemoglobin A1c  - Albumin Random Urine Quantitative with Creat Ratio  - FOOT EXAM  - Comprehensive metabolic panel (BMP + Alb, Alk Phos, ALT, AST, Total. Bili, TP)      Generalized pruritus  - CBC with platelets  - TSH with free T4 reflex    Atopic dermatitis, unspecified type  Patient benefited from recent trial of topical steroid.  Prescribed Clobetasol to use.  Discussed risks with long term use.  Do not recommend using for longer than 10 days.  If not improving, see me back.   - clobetasol (TEMOVATE) 0.05 % external cream  Dispense: 45 g; Refill: 1         BMI:   Estimated body mass index is 26.54 kg/m  as calculated from the following:    Height as of 1/27/23: 1.473 m (4' 10\").    Weight as of this encounter: 57.6 kg (127 lb).         Faith Genao NP  [facility]    Giulia Hodges is a 82 year old accompanied by her daughter who presents for follow up.  Patient declines a formal  and daughter was used.    History of Type 2 diabetes.  Last Hgb A1c was 9.6.  Tradjenta was added at that time in addition to he Metformin, Actos, and Jardiance. Last eye exam in July at [facility].    Continues to have generalized itching to her skin.  Primarily to the arms.  Last time we spoke there was no specific rash.  She sometimes gets very light redness.rash.  Recently tried a family member's steroid cream, and this was incredibly helpful.  She has been able to sleep much better without the itching.     Follow Up and Derm Problem (Itchy all over ; been using cream )      History of Present Illness       Diabetes:   She presents for follow up of diabetes.  She is checking home blood glucose two times daily.   She " checks blood glucose before and after meals.  Blood glucose is sometimes over 200 and sometimes under 70. She is aware of hypoglycemia symptoms including weakness.   She is concerned about other.    She is not experiencing numbness or burning in feet, excessive thirst, blurry vision, weight changes or redness, sores or blisters on feet. The patient has had a diabetic eye exam in the last 12 months. Eye exam performed on july 2023. Location of last eye exam [facility].        Hypertension: She presents for follow up of hypertension.  She does check blood pressure  regularly outside of the clinic. Outside blood pressures have been over 140/90. She does not follow a low salt diet.     She eats 0-1 servings of fruits and vegetables daily.She consumes 1 sweetened beverage(s) daily.She exercises with enough effort to increase her heart rate 20 to 29 minutes per day.  She exercises with enough effort to increase her heart rate 4 days per week.   She is taking medications regularly.         Review of Systems   Pertinent items in HPI        Objective    /56 (BP Location: Right arm, Patient Position: Sitting, Cuff Size: Adult Regular)   Pulse 66   Temp 97.1  F (36.2  C) (Temporal)   Resp 14   Wt 57.6 kg (127 lb)   SpO2 100%   BMI 26.54 kg/m    Body mass index is 26.54 kg/m .  Physical Exam   GENERAL: healthy, alert and no distress  EYES: Eyes grossly normal to inspection, PERRL and conjunctivae and sclerae normal  NECK: no adenopathy, no asymmetry, masses, or scars and thyroid normal to palpation  RESP: lungs clear to auscultation - no rales, rhonchi or wheezes  CV: regular rate and rhythm, normal S1 S2, no S3 or S4, no murmur, click or rub, no peripheral edema  SKIN: no suspicious lesions or rashes  Diabetic foot exam: normal DP and PT pulses, no trophic changes or ulcerative lesions, and normal sensory exam

## 2023-10-11 NOTE — LETTER
October 17, 2023      Tracie Yen  [address] [address]        Dear ,    We are writing to inform you of your test results.    The remainder of Tracie's labs look fairly stable.  Kidney function is down slightly, but we will keep watching this an recheck at next scheduled visit.     Please let me know if you have any questions.       Resulted Orders   Hemoglobin A1c   Result Value Ref Range    Hemoglobin A1C 8.4 (H) 0.0 - 5.6 %      Comment:      Normal <5.7%   Prediabetes 5.7-6.4%    Diabetes 6.5% or higher     Note: Adopted from ADA consensus guidelines.   Albumin Random Urine Quantitative with Creat Ratio   Result Value Ref Range    Creatinine Urine mg/dL 44.7 mg/dL      Comment:      The reference ranges have not been established in urine creatinine. The results should be integrated into the clinical context for interpretation.    Albumin Urine mg/L <12.0 mg/L      Comment:      The reference ranges have not been established in urine albumin. The results should be integrated into the clinical context for interpretation.    Albumin Urine mg/g Cr        Comment:      Unable to calculate, urine albumin and/or urine creatinine is outside detectable limits.  Microalbuminuria is defined as an albumin:creatinine ratio of 17 to 299 for males and 25 to 299 for females. A ratio of albumin:creatinine of 300 or higher is indicative of overt proteinuria.  Due to biologic variability, positive results should be confirmed by a second, first-morning random or 24-hour timed urine specimen. If there is discrepancy, a third specimen is recommended. When 2 out of 3 results are in the microalbuminuria range, this is evidence for incipient nephropathy and warrants increased efforts at glucose control, blood pressure control, and institution of therapy with an angiotensin-converting-enzyme (ACE) inhibitor (if the patient can tolerate it).     Comprehensive metabolic panel (BMP + Alb, Alk Phos, ALT, AST,  Total. Bili, TP)   Result Value Ref Range    Sodium 140 135 - 145 mmol/L      Comment:      Reference intervals for this test were updated on 09/26/2023 to more accurately reflect our healthy population. There may be differences in the flagging of prior results with similar values performed with this method. Interpretation of those prior results can be made in the context of the updated reference intervals.     Potassium 5.0 3.4 - 5.3 mmol/L    Carbon Dioxide (CO2) 25 22 - 29 mmol/L    Anion Gap 10 7 - 15 mmol/L    Urea Nitrogen 36.7 (H) 8.0 - 23.0 mg/dL    Creatinine 1.47 (H) 0.51 - 0.95 mg/dL    GFR Estimate 35 (L) >60 mL/min/1.73m2    Calcium 9.5 8.8 - 10.2 mg/dL    Chloride 105 98 - 107 mmol/L    Glucose 211 (H) 70 - 99 mg/dL    Alkaline Phosphatase 94 35 - 104 U/L    AST 19 0 - 45 U/L      Comment:      Reference intervals for this test were updated on 6/12/2023 to more accurately reflect our healthy population. There may be differences in the flagging of prior results with similar values performed with this method. Interpretation of those prior results can be made in the context of the updated reference intervals.    ALT 11 0 - 50 U/L      Comment:      Reference intervals for this test were updated on 6/12/2023 to more accurately reflect our healthy population. There may be differences in the flagging of prior results with similar values performed with this method. Interpretation of those prior results can be made in the context of the updated reference intervals.      Protein Total 7.4 6.4 - 8.3 g/dL    Albumin 4.2 3.5 - 5.2 g/dL    Bilirubin Total 0.3 <=1.2 mg/dL   CBC with platelets   Result Value Ref Range    WBC Count 8.6 4.0 - 11.0 10e3/uL    RBC Count 4.50 3.80 - 5.20 10e6/uL    Hemoglobin 12.4 11.7 - 15.7 g/dL    Hematocrit 39.2 35.0 - 47.0 %    MCV 87 78 - 100 fL    MCH 27.6 26.5 - 33.0 pg    MCHC 31.6 31.5 - 36.5 g/dL    RDW 13.5 10.0 - 15.0 %    Platelet Count 243 150 - 450 10e3/uL   TSH with free  T4 reflex   Result Value Ref Range    TSH 2.38 0.30 - 4.20 uIU/mL         Sincerely,      Faith Genao NP

## 2023-11-20 DIAGNOSIS — G62.9 PERIPHERAL POLYNEUROPATHY: ICD-10-CM

## 2023-11-20 DIAGNOSIS — I10 BENIGN ESSENTIAL HYPERTENSION: ICD-10-CM

## 2023-11-20 DIAGNOSIS — E11.40 TYPE 2 DIABETES MELLITUS WITH DIABETIC NEUROPATHY, WITHOUT LONG-TERM CURRENT USE OF INSULIN (H): ICD-10-CM

## 2023-11-20 RX ORDER — EMPAGLIFLOZIN 25 MG/1
25 TABLET, FILM COATED ORAL DAILY
Qty: 90 TABLET | Refills: 1 | Status: SHIPPED | OUTPATIENT
Start: 2023-11-20 | End: 2024-04-23

## 2023-11-20 RX ORDER — PREGABALIN 50 MG/1
CAPSULE ORAL
Qty: 90 CAPSULE | Refills: 1 | Status: SHIPPED | OUTPATIENT
Start: 2023-11-20 | End: 2024-04-23

## 2023-11-20 RX ORDER — PIOGLITAZONEHYDROCHLORIDE 45 MG/1
45 TABLET ORAL DAILY
Qty: 90 TABLET | Refills: 1 | Status: SHIPPED | OUTPATIENT
Start: 2023-11-20 | End: 2024-04-23

## 2023-11-20 RX ORDER — LOSARTAN POTASSIUM 50 MG/1
50 TABLET ORAL DAILY
Qty: 90 TABLET | Refills: 1 | Status: SHIPPED | OUTPATIENT
Start: 2023-11-20 | End: 2024-04-23

## 2024-01-23 ENCOUNTER — PATIENT OUTREACH (OUTPATIENT)
Dept: GERIATRIC MEDICINE | Facility: CLINIC | Age: 84
End: 2024-01-23
Payer: COMMERCIAL

## 2024-01-23 NOTE — PROGRESS NOTES
[facility] Care Coordination Contact      [facility] Six-Month Telephone Assessment    6 month telephone assessment completed on 1/23/24.    ER visits: No  Hospitalizations: No  TCU stays: No  Significant health status changes: None reported   Falls/Injuries: No  ADL/IADL changes: No  Changes in services: No    Caregiver Assessment follow up:  NA    Goals: See POC in chart for goal progress documentation.      Will see member in 6 months for an annual health risk assessment.   Encouraged member to call CC with any questions or concerns in the meantime.     Jenn Tena RN PHN  [facility]  Care Coordinator  [phone]

## 2024-02-13 ENCOUNTER — PATIENT OUTREACH (OUTPATIENT)
Dept: GERIATRIC MEDICINE | Facility: CLINIC | Age: 84
End: 2024-02-13
Payer: COMMERCIAL

## 2024-02-13 DIAGNOSIS — I10 BENIGN ESSENTIAL HYPERTENSION: Primary | ICD-10-CM

## 2024-02-13 NOTE — PROGRESS NOTES
[facility] Care Coordination Contact    Care coordinator received voice mail from SADIA Nath from [facility] [phone] requesting new BP machine.  Current machine is no longer working and she already changed batteries.      RX sent to PCP with request for signature if appropriate.  Lft voice mail with updates with Pham.     Jenn Tena RN PHN  [facility]  Care Coordinator  [phone]

## 2024-02-15 ENCOUNTER — PATIENT OUTREACH (OUTPATIENT)
Dept: GERIATRIC MEDICINE | Facility: CLINIC | Age: 84
End: 2024-02-15
Payer: COMMERCIAL

## 2024-02-15 NOTE — PROGRESS NOTES
[facility] Care Coordination Contact    RX secure email to [facility] for BP machine.  Tasked CMS to track item.    Jenn Tena RN PHN  [facility]  Care Coordinator  [phone]

## 2024-02-19 DIAGNOSIS — E11.40 TYPE 2 DIABETES MELLITUS WITH DIABETIC NEUROPATHY, WITHOUT LONG-TERM CURRENT USE OF INSULIN (H): ICD-10-CM

## 2024-02-19 RX ORDER — SIMVASTATIN 20 MG
20 TABLET ORAL AT BEDTIME
Qty: 90 TABLET | Refills: 0 | Status: SHIPPED | OUTPATIENT
Start: 2024-02-19 | End: 2024-04-23

## 2024-02-19 RX ORDER — LANCETS 33 GAUGE
EACH MISCELLANEOUS
Qty: 300 EACH | Refills: 0 | Status: SHIPPED | OUTPATIENT
Start: 2024-02-19

## 2024-03-22 DIAGNOSIS — R32 URINARY INCONTINENCE, UNSPECIFIED TYPE: Primary | ICD-10-CM

## 2024-03-25 DIAGNOSIS — R32 URINARY INCONTINENCE, UNSPECIFIED TYPE: ICD-10-CM

## 2024-04-01 NOTE — PROGRESS NOTES
[facility] Care Coordination Contact    Per [employer]. CC notify of delivery    Yovana, May lo 1940 BP Machine 2/15/2024 Jenn RICHARDSON [facility] DELIVERED 3/19/2024        Dea Soto  Care Management Specialist  [facility]  [phone]

## 2024-04-23 ENCOUNTER — OFFICE VISIT (OUTPATIENT)
Dept: FAMILY MEDICINE | Facility: CLINIC | Age: 84
End: 2024-04-23
Payer: COMMERCIAL

## 2024-04-23 VITALS
RESPIRATION RATE: 16 BRPM | HEIGHT: 57 IN | HEART RATE: 61 BPM | DIASTOLIC BLOOD PRESSURE: 48 MMHG | BODY MASS INDEX: 28.48 KG/M2 | OXYGEN SATURATION: 99 % | WEIGHT: 132 LBS | TEMPERATURE: 97 F | SYSTOLIC BLOOD PRESSURE: 100 MMHG

## 2024-04-23 DIAGNOSIS — Z00.00 ENCOUNTER FOR MEDICARE ANNUAL WELLNESS EXAM: Primary | ICD-10-CM

## 2024-04-23 DIAGNOSIS — E11.40 TYPE 2 DIABETES MELLITUS WITH DIABETIC NEUROPATHY, WITHOUT LONG-TERM CURRENT USE OF INSULIN (H): ICD-10-CM

## 2024-04-23 DIAGNOSIS — N18.32 CHRONIC KIDNEY DISEASE, STAGE 3B (H): ICD-10-CM

## 2024-04-23 DIAGNOSIS — G62.9 PERIPHERAL POLYNEUROPATHY: ICD-10-CM

## 2024-04-23 DIAGNOSIS — I10 BENIGN ESSENTIAL HYPERTENSION: ICD-10-CM

## 2024-04-23 DIAGNOSIS — E78.2 MIXED HYPERLIPIDEMIA: ICD-10-CM

## 2024-04-23 PROBLEM — N18.31 CHRONIC KIDNEY DISEASE, STAGE 3A (H): Status: RESOLVED | Noted: 2023-02-07 | Resolved: 2024-04-23

## 2024-04-23 LAB — HBA1C MFR BLD: 8 % (ref 0–5.6)

## 2024-04-23 PROCEDURE — 83036 HEMOGLOBIN GLYCOSYLATED A1C: CPT | Performed by: NURSE PRACTITIONER

## 2024-04-23 PROCEDURE — 80061 LIPID PANEL: CPT | Performed by: NURSE PRACTITIONER

## 2024-04-23 PROCEDURE — 36415 COLL VENOUS BLD VENIPUNCTURE: CPT | Performed by: NURSE PRACTITIONER

## 2024-04-23 PROCEDURE — 99214 OFFICE O/P EST MOD 30 MIN: CPT | Mod: 25 | Performed by: NURSE PRACTITIONER

## 2024-04-23 PROCEDURE — 90471 IMMUNIZATION ADMIN: CPT | Performed by: NURSE PRACTITIONER

## 2024-04-23 PROCEDURE — 90715 TDAP VACCINE 7 YRS/> IM: CPT | Performed by: NURSE PRACTITIONER

## 2024-04-23 PROCEDURE — G0438 PPPS, INITIAL VISIT: HCPCS | Performed by: NURSE PRACTITIONER

## 2024-04-23 PROCEDURE — 80053 COMPREHEN METABOLIC PANEL: CPT | Performed by: NURSE PRACTITIONER

## 2024-04-23 RX ORDER — LOSARTAN POTASSIUM 50 MG/1
50 TABLET ORAL DAILY
Qty: 90 TABLET | Refills: 3 | Status: SHIPPED | OUTPATIENT
Start: 2024-04-23

## 2024-04-23 RX ORDER — PREGABALIN 50 MG/1
50 CAPSULE ORAL DAILY
Qty: 90 CAPSULE | Refills: 3 | Status: SHIPPED | OUTPATIENT
Start: 2024-04-23

## 2024-04-23 RX ORDER — PIOGLITAZONEHYDROCHLORIDE 45 MG/1
45 TABLET ORAL DAILY
Qty: 90 TABLET | Refills: 3 | Status: SHIPPED | OUTPATIENT
Start: 2024-04-23

## 2024-04-23 RX ORDER — METFORMIN HYDROCHLORIDE 750 MG/1
750 TABLET, EXTENDED RELEASE ORAL DAILY
Qty: 90 TABLET | Refills: 3 | Status: CANCELLED | OUTPATIENT
Start: 2024-04-23

## 2024-04-23 RX ORDER — SIMVASTATIN 20 MG
20 TABLET ORAL AT BEDTIME
Qty: 90 TABLET | Refills: 3 | Status: SHIPPED | OUTPATIENT
Start: 2024-04-23

## 2024-04-23 RX ORDER — METFORMIN HYDROCHLORIDE 750 MG/1
750 TABLET, EXTENDED RELEASE ORAL DAILY
Qty: 90 TABLET | Refills: 3 | Status: SHIPPED | OUTPATIENT
Start: 2024-04-23

## 2024-04-23 RX ORDER — SIMVASTATIN 20 MG
20 TABLET ORAL AT BEDTIME
Qty: 90 TABLET | Refills: 3 | Status: SHIPPED | OUTPATIENT
Start: 2024-04-23 | End: 2024-04-23

## 2024-04-23 RX ORDER — RESPIRATORY SYNCYTIAL VIRUS VACCINE 120MCG/0.5
0.5 KIT INTRAMUSCULAR ONCE
Qty: 1 EACH | Refills: 0 | Status: CANCELLED | OUTPATIENT
Start: 2024-04-23 | End: 2024-04-23

## 2024-04-23 SDOH — HEALTH STABILITY: PHYSICAL HEALTH: ON AVERAGE, HOW MANY DAYS PER WEEK DO YOU ENGAGE IN MODERATE TO STRENUOUS EXERCISE (LIKE A BRISK WALK)?: 4 DAYS

## 2024-04-23 ASSESSMENT — SOCIAL DETERMINANTS OF HEALTH (SDOH): HOW OFTEN DO YOU GET TOGETHER WITH FRIENDS OR RELATIVES?: MORE THAN THREE TIMES A WEEK

## 2024-04-23 NOTE — COMMUNITY RESOURCES LIST (ENGLISH)
April 23, 2024           YOUR PERSONALIZED LIST OF SERVICES & PROGRAMS           & SHELTER    Housing      [facility] - Emergency Family Shelter  [address] [place] [address] (Distance: 9.2 miles)  Phone: [phone]) [phone]  Website: https://www.saintandrews.[facility]/  Language: English  Fee: Free      [facility] Resources and Housing Information - Emergency housing  [address] [place] [address] (Distance: 7.8 miles)  Language: English  Fee: Free  Accessibility: Translation services      [facility] - Housing Stabilization Services  Phone: (994) 147-1687  Website: https://"[employer]"/Housing-Stabilization.html  Language: English  Hours: Mon 9:00 AM - 5:00 PM Tue 9:00 AM - 5:00 PM Wed 9:00 AM - 5:00 PM Thu 9:00 AM - 5:00 PM Fri 9:00 AM - 5:00 PM  Fee: Insurance  Accessibility: Deaf or hard of hearing, Translation services    Case Management      [facility] Resources and Housing Information - Housing search assistance  [address] [place] [address] (Distance: 7.8 miles)  Language: English  Fee: Free  Accessibility: Translation services      [facility]  [address] [place] [address] (Distance: 3.8 miles)  Phone: (309) 732-8314  Language: English  Fee: Free  Accessibility: Translation services      [employer] - Housing Stabilization Services  Phone: (471) 832-6895  Website: https://homebasemn.com/  Language: English  Hours: Mon 8:00 AM - 4:00 PM Tue 8:00 AM - 4:00 PM Wed 8:00 AM - 4:00 PM Thu 8:00 AM - 4:00 PM Fri 8:00 AM - 4:00 PM  Fee: Free  Accessibility: Blind accommodation, Deaf or hard of hearing  Transportation Options: Free transportation    Drop-In Services      [facility] - Elder Independent Living Program  [address] [place] [address] (Distance: 15.1 miles)  Website:  https://www.ucamn.info/elders  Language: English      [facility] - Warming or cooling center  [address] [place] [address] (Distance: 6.8 miles)  Phone: (206) [phone]  Language: English, Croatian, Slovak, Hmong  Fee: Free  Accessibility: Translation services      [employer]  Website: http://www.laundArdelyxlove.org               IMPORTANT NUMBERS & WEBSITES        Emergency Services  911  .   United Way  211 http://211unitedway.org  .   Poison Control  (541) 104-7349 http://[place]poison.org http://wis[place]poison.org  .     Suicide and Crisis Lifeline  988 http://988Smart Surgicalline.org  .   Childhelp [place] Child Abuse Hotline  173.706.9559 http://Childhelphotline.org   .   [place] Sexual Assault Hotline  (286) 894-8059 (HOPE) http://[employer].qualifyor   .     [place] Runaway Safeline  (878) 938-9007 (RUNAWAY) http://web2media.sk.qualifyor  .   Pregnancy & Postpartum Support  Call/text 143-276-6815  [place]: http://ppsupportmn.org  [place]: http://[employer] App[employer].com/wi  .   Substance Abuse National Helpline ([facility])  887-637-HELP (6549) http://Findtreatment.gov   .                DISCLAIMER: These resources have been generated via the [employer] Platform. [employer] does not endorse any service providers mentioned in this resource list. [employer] does not guarantee that the services mentioned in this resource list will be available to you or will improve your health or wellness.    [facility]

## 2024-04-23 NOTE — PROGRESS NOTES
"Preventive Care Visit  [facility]  Faith Genao NP, Family Medicine  Apr 23, 2024      Assessment & Plan     Encounter for Medicare annual wellness exam    Type 2 diabetes mellitus with diabetic neuropathy, without long-term current use of insulin (H)  Hgb A1c improved to 8.0.  Clarified with daughter, and she is taking Tradjenta.  Continue current medications and follow up in 6 months.   - HEMOGLOBIN A1C  - Comprehensive metabolic panel (BMP + Alb, Alk Phos, ALT, AST, Total. Bili, TP)  - empagliflozin (JARDIANCE) 25 MG TABS tablet  Dispense: 90 tablet; Refill: 3  - linagliptin (TRADJENTA) 5 MG TABS tablet  Dispense: 90 tablet; Refill: 3  - metFORMIN (GLUCOPHAGE-XR) 750 MG 24 hr tablet  Dispense: 90 tablet; Refill: 3  - pioglitazone (ACTOS) 45 MG tablet  Dispense: 90 tablet; Refill: 3  - Albumin Random Urine Quantitative with Creat Ratio    Chronic kidney disease, stage 3b (H)  GFR 31    Benign essential hypertension  Stable.   - losartan (COZAAR) 50 MG tablet  Dispense: 90 tablet; Refill: 3    Peripheral polyneuropathy  Stable.   - pregabalin (LYRICA) 50 MG capsule  Dispense: 90 capsule; Refill: 3    Mixed hyperlipidemia  - Lipid panel reflex to direct LDL Non-fasting  - simvastatin (ZOCOR) 20 MG tablet  Dispense: 90 tablet; Refill: 3      Patient has been advised of split billing requirements and indicates understanding: Yes        BMI  Estimated body mass index is 28.56 kg/m  as calculated from the following:    Height as of this encounter: 1.448 m (4' 9\").    Weight as of this encounter: 59.9 kg (132 lb).   Weight management plan: Discussed healthy diet and exercise guidelines    Counseling  Appropriate preventive services were discussed with this patient, including applicable screening as appropriate for fall prevention, nutrition, physical activity, Tobacco-use cessation, weight loss and cognition.  Checklist reviewing preventive services available has been given to the " patient.  Reviewed patient's diet, addressing concerns and/or questions.   Updated plan of care.  Patient reported difficulty with activities of daily living were addressed today.The patient was provided with written information regarding signs of hearing loss.   Information on urinary incontinence and treatment options given to patient.   I have reviewed Opioid Use Disorder and Substance Use Disorder risk factors and made any needed referrals.           Giulia Hodges is a 83 year old, presenting for the following:  Annual Visit (Nonfasting )    Patient is accompanied by her daughter who is interpreting.  Patient declines a formal .    History of type 2 diabetes.  She is prescribed Actos, metformin, Tradjenta, and Jardiance.  Daughter is unsure if she is taking Tradjenta.  Fasting blood sugar today was 136.  Patient is not having any low blood sugars.  She has historically declined injectable medications.    On losartan for hypertension.  Patient denies any recent falls or dizziness/lightheadedness.  She is not checking blood pressures at all at home.    On a statin and tolerating well.     Patient has pregabalin prescribed for peripheral neuropathy.  Daughter is unsure if she is using this either.  It looks like she did  her prescription last month.  Has been using acupuncture for general aches and pains.    Health Care Directive  Patient does not have a Health Care Directive or Living Will: Discussed advance care planning with patient; however, patient declined at this time.          4/23/2024   General Health   How would you rate your overall physical health? Good   Feel stress (tense, anxious, or unable to sleep) Not at all         4/23/2024   Nutrition   Diet: Regular (no restrictions)         4/23/2024   Exercise   Days per week of moderate/strenous exercise 4 days         4/23/2024   Social Factors   Frequency of gathering with friends or relatives More than three times a week   Worry  food won't last until get money to buy more No   Food not last or not have enough money for food? No   Do you have housing?  No   Are you worried about losing your housing? No   Lack of transportation? No   Unable to get utilities (heat,electricity)? No   Want help with housing or utility concern? No   (!) HOUSING CONCERN PRESENT      4/23/2024   Fall Risk   Fallen 2 or more times in the past year? No   Trouble with walking or balance? Yes   Gait Speed Test (Document in seconds) 4.29   Gait Speed Test Interpretation Less than or equal to 5.00 seconds - PASS          4/23/2024   Activities of Daily Living- Home Safety   Needs help with the following daily activites Shopping   Safety concerns in the home None of the above         4/23/2024   Dental   Dentist two times every year? Yes         4/23/2024   Hearing Screening   Hearing concerns? (!) I NEED TO ASK PEOPLE TO SPEAK UP OR REPEAT THEMSELVES.    (!) TROUBLE UNDERSTANDING SOFT OR WHISPERED SPEECH.    (!) TROUBLE UNDERSTANDING SPEECH ON THE TELEPHONE         4/23/2024   Driving Risk Screening   Patient/family members have concerns about driving No         4/23/2024   General Alertness/Fatigue Screening   Have you been more tired than usual lately? No         4/23/2024   Urinary Incontinence Screening   Bothered by leaking urine in past 6 months Yes         4/23/2024   TB Screening   Were you born outside of the [place]? No         Today's PHQ-2 Score:       4/23/2024     3:57 PM   PHQ-2 ( 1999 [employer])   Q1: Little interest or pleasure in doing things 0   Q2: Feeling down, depressed or hopeless 1   PHQ-2 Score 1   Q1: Little interest or pleasure in doing things Not at all   Q2: Feeling down, depressed or hopeless Several days   PHQ-2 Score 1           4/23/2024   Substance Use   Alcohol more than 3/day or more than 7/wk No   Do you have a current opioid prescription? NO   How severe/bad is pain from 1 to 10? 6/10   Do you use any other substances recreationally? No             4/23/2024     5:17 PM   OPIOID RISK TOOL TOTAL SCORE   Total Score 0     Low Risk (0-3)  Moderate Risk (4-7)  High Risk (>8)  Social History     Tobacco Use    Smoking status: Never     Passive exposure: Never    Smokeless tobacco: Never   Vaping Use    Vaping status: Never Used          Mammogram Screening - After age 74- determine frequency with patient based on health status, life expectancy and patient goals              Reviewed and updated as needed this visit by Provider   Tobacco  Allergies  Meds  Problems  Med Hx  Surg Hx  Fam Hx            Current providers sharing in care for this patient include:  Patient Care Team:  Faith Genao NP as PCP - General (Family Medicine)  Saranya Villasenor, PharmD as Pharmacist (Pharmacist)  Saranya Villasenor, PharmD as Assigned MTM Pharmacist  Jenn Tena, RN as Lead Care Coordinator (Primary Care - CC)  Faith Genao NP as Assigned PCP    The following health maintenance items are reviewed in Epic and correct as of today:  Health Maintenance   Topic Date Due    PARATHYROID  Never done    PHOSPHORUS  Never done    ADVANCE CARE PLANNING  Never done    ZOSTER IMMUNIZATION (1 of 2) Never done    RSV VACCINE (Pregnancy & 60+) (1 - 1-dose 60+ series) Never done    IPV IMMUNIZATION (2 of 3 - Adult catch-up series) 12/08/1998    LIPID  02/07/2024    COVID-19 Vaccine (2 - 2023-24 season) 02/27/2024    MICROALBUMIN  04/11/2024    EYE EXAM  07/01/2024    BMP  10/11/2024    DIABETIC FOOT EXAM  10/11/2024    ANNUAL REVIEW OF HM ORDERS  10/11/2024    HEMOGLOBIN  10/11/2024    A1C  10/23/2024    MEDICARE ANNUAL WELLNESS VISIT  04/23/2025    FALL RISK ASSESSMENT  04/23/2025    DTAP/TDAP/TD IMMUNIZATION (3 - Td or Tdap) 04/23/2034    PHQ-2 (once per calendar year)  Completed    INFLUENZA VACCINE  Completed    Pneumococcal Vaccine: 65+ Years  Completed    URINALYSIS  Completed    ALK PHOS  Completed    HPV IMMUNIZATION  Aged Out    MENINGITIS IMMUNIZATION  Aged Out    RSV  "MONOCLONAL ANTIBODY  Aged Out         Review of Systems  Pertinent items in HPI       Objective    Exam  /48 (BP Location: Right arm, Patient Position: Sitting, Cuff Size: Adult Regular)   Pulse 61   Temp 97  F (36.1  C) (Temporal)   Resp 16   Ht 1.448 m (4' 9\")   Wt 59.9 kg (132 lb)   SpO2 99%   BMI 28.56 kg/m     Estimated body mass index is 28.56 kg/m  as calculated from the following:    Height as of this encounter: 1.448 m (4' 9\").    Weight as of this encounter: 59.9 kg (132 lb).    Physical Exam  GENERAL: alert and no distress  EYES: Eyes grossly normal to inspection, PERRL and conjunctivae and sclerae normal  HENT: ear canals and TM's normal, nose and mouth without ulcers or lesions  NECK: no adenopathy, no asymmetry, masses, or scars  RESP: lungs clear to auscultation - no rales, rhonchi or wheezes  CV: regular rate and rhythm, normal S1 S2, no S3 or S4, no murmur, click or rub, no peripheral edema  ABDOMEN: soft, nontender, no hepatosplenomegaly, no masses and bowel sounds normal  MS: no gross musculoskeletal defects noted, no edema  SKIN: no suspicious lesions or rashes  NEURO: Normal strength and tone, mentation intact and speech normal  PSYCH: mentation appears normal, affect normal/bright        4/23/2024   Mini Cog   Mini-Cog Not Completed (choose reason) Language barrier and no  present              Signed Electronically by: Faith Genao NP    "

## 2024-04-23 NOTE — PATIENT INSTRUCTIONS
Preventive Care Advice   This is general advice given by our system to help you stay healthy. However, your care team may have specific advice just for you. Please talk to your care team about your preventive care needs.  Nutrition  Eat 5 or more servings of fruits and vegetables each day.  Try wheat bread, brown rice and whole grain pasta (instead of white bread, rice, and pasta).  Get enough calcium and vitamin D. Check the label on foods and aim for 100% of the RDA (recommended daily allowance).  Lifestyle  Exercise at least 150 minutes each week   (30 minutes a day, 5 days a week).  Do muscle strengthening activities 2 days a week. These help control your weight and prevent disease.  No smoking.  Wear sunscreen to prevent skin cancer.  Have a dental exam and cleaning every 6 months.  Yearly exams  See your health care team every year to talk about:  Any changes in your health.  Any medicines your care team has prescribed.  Preventive care, family planning, and ways to prevent chronic diseases.  Shots (vaccines)   HPV shots (up to age 26), if you've never had them before.  Hepatitis B shots (up to age 59), if you've never had them before.  COVID-19 shot: Get this shot when it's due.  Flu shot: Get a flu shot every year.  Tetanus shot: Get a tetanus shot every 10 years.  Pneumococcal, hepatitis A, and RSV shots: Ask your care team if you need these based on your risk.  Shingles shot (for age 50 and up).  General health tests  Diabetes screening:  Starting at age 35, Get screened for diabetes at least every 3 years.  If you are younger than age 35, ask your care team if you should be screened for diabetes.  Cholesterol test: At age 39, start having a cholesterol test every 5 years, or more often if advised.  Bone density scan (DEXA): At age 50, ask your care team if you should have this scan for osteoporosis (brittle bones).  Hepatitis C: Get tested at least once in your life.  STIs (sexually transmitted  infections)  Before age 24: Ask your care team if you should be screened for STIs.  After age 24: Get screened for STIs if you're at risk. You are at risk for STIs (including HIV) if:  You are sexually active with more than one person.  You don't use condoms every time.  You or a partner was diagnosed with a sexually transmitted infection.  If you are at risk for HIV, ask about PrEP medicine to prevent HIV.  Get tested for HIV at least once in your life, whether you are at risk for HIV or not.  Cancer screening tests  Cervical cancer screening: If you have a cervix, begin getting regular cervical cancer screening tests at age 21. Most people who have regular screenings with normal results can stop after age 65. Talk about this with your provider.  Breast cancer scan (mammogram): If you've ever had breasts, begin having regular mammograms starting at age 40. This is a scan to check for breast cancer.  Colon cancer screening: It is important to start screening for colon cancer at age 45.  Have a colonoscopy test every 10 years (or more often if you're at risk) Or, ask your provider about stool tests like a FIT test every year or Cologuard test every 3 years.  To learn more about your testing options, visit: [email or web address].  For help making a decision, visit: https://bit.ly/pl00718.  Prostate cancer screening test: If you have a prostate and are age 55 to 69, ask your provider if you would benefit from a yearly prostate cancer screening test.  Lung cancer screening: If you are a current or former smoker age 50 to 80, ask your care team if ongoing lung cancer screenings are right for you.  For informational purposes only. Not to replace the advice of your health care provider. Copyright   2023 [facility]. All rights reserved. Clinically reviewed by the [facility]itions [facility]. "[employer] 602799 - REV 01/24.    Learning About Activities of Daily Living  What are activities  of daily living?     Activities of daily living (ADLs) are the basic self-care tasks you do every day. These include eating, bathing, dressing, and moving around.  As you age, and if you have health problems, you may find that it's harder to do some of these tasks. If so, your doctor can suggest ideas that may help.  To measure what kind of help you may need, your doctor will ask how well you are able to do ADLs. Let your doctor know if there are any tasks that you are having trouble doing. This is an important first step to getting help. And when you have the help you need, you can stay as independent as possible.  How will a doctor assess your ADLs?  Asking about ADLs is part of a routine health checkup your doctor will likely do as you age. Your health check might be done in a doctor's office, in your home, or at a hospital. The goal is to find out if you are having any problems that could make it hard to care for yourself or that make it unsafe for you to be on your own.  To measure your ADLs, your doctor will ask how hard it is for you to do routine tasks. Your doctor may also want to know if you have changed the way you do a task because of a health problem. Your doctor may watch how you:  Walk back and forth.  Keep your balance while you stand or walk.  Move from sitting to standing or from a bed to a chair.  Button or unbutton a shirt or sweater.  Remove and put on your shoes.  It's common to feel a little worried or anxious if you find you can't do all the things you used to be able to do. Talking with your doctor about ADLs is a way to make sure you're as safe as possible and able to care for yourself as well as you can. You may want to bring a caregiver, friend, or family member to your checkup. They can help you talk to your doctor.  Follow-up care is a key part of your treatment and safety. Be sure to make and go to all appointments, and call your doctor if you are having problems. It's also a good idea  to know your test results and keep a list of the medicines you take.  Current as of: October 24, 2023               Content Version: 14.0    6829-0760 [employer].   Care instructions adapted under license by your healthcare professional. If you have questions about a medical condition or this instruction, always ask your healthcare professional. [employer] disclaims any warranty or liability for your use of this information.      Preventing Falls: Care Instructions  Injuries and health problems such as trouble walking or poor eyesight can increase your risk of falling. So can some medicines. But there are things you can do to help prevent falls. You can exercise to get stronger. You can also arrange your home to make it safer.    Talk to your doctor about the medicines you take. Ask if any of them increase the risk of falls and whether they can be changed or stopped.   Try to exercise regularly. It can help improve your strength and balance. This can help lower your risk of falling.     Practice fall safety and prevention.    Wear low-heeled shoes that fit well and give your feet good support. Talk to your doctor if you have foot problems that make this hard.  Carry a cellphone or wear a medical alert device that you can use to call for help.  Use stepladders instead of chairs to reach high objects. Don't climb if you're at risk for falls. Ask for help, if needed.  Wear the correct eyeglasses, if you need them.    Make your home safer.    Remove rugs, cords, clutter, and furniture from walkways.  Keep your house well lit. Use night-lights in hallways and bathrooms.  Install and use sturdy handrails on stairways.  Wear nonskid footwear, even inside. Don't walk barefoot or in socks without shoes.    Be safe outside.    Use handrails, curb cuts, and ramps whenever possible.  Keep your hands free by using a shoulder bag or backpack.  Try to walk in well-lit areas. Watch out for uneven ground,  "changes in pavement, and debris.  Be careful in the winter. Walk on the grass or gravel when sidewalks are slippery. Use de-icer on steps and walkways. Add non-slip devices to shoes.    Put grab bars and nonskid mats in your shower or tub and near the toilet. Try to use a shower chair or bath bench when bathing.   Get into a tub or shower by putting in your weaker leg first. Get out with your strong side first. Have a phone or medical alert device in the bathroom with you.   Where can you learn more?  Go to https://www.ZapHour.net/patiented  Enter G117 in the search box to learn more about \"Preventing Falls: Care Instructions.\"  Current as of: July 17, 2023               Content Version: 14.0    8744-3138 [employer].   Care instructions adapted under license by your healthcare professional. If you have questions about a medical condition or this instruction, always ask your healthcare professional. [employer] disclaims any warranty or liability for your use of this information.      Hearing Loss: Care Instructions  Overview     Hearing loss is a sudden or slow decrease in how well you hear. It can range from slight to profound. Permanent hearing loss can occur with aging. It also can happen when you are exposed long-term to loud noise. Examples include listening to loud music, riding motorcycles, or being around other loud machines.  Hearing loss can affect your work and home life. It can make you feel lonely or depressed. You may feel that you have lost your independence. But hearing aids and other devices can help you hear better and feel connected to others.  Follow-up care is a key part of your treatment and safety. Be sure to make and go to all appointments, and call your doctor if you are having problems. It's also a good idea to know your test results and keep a list of the medicines you take.  How can you care for yourself at home?  Avoid loud noises whenever possible. This " helps keep your hearing from getting worse.  Always wear hearing protection around loud noises.  Wear a hearing aid as directed.  A professional can help you pick a hearing aid that will work best for you.  You can also get hearing aids over the counter for mild to moderate hearing loss.  Have hearing tests as your doctor suggests. They can show whether your hearing has changed. Your hearing aid may need to be adjusted.  Use other devices as needed. These may include:  Telephone amplifiers and hearing aids that can connect to a television, stereo, radio, or microphone.  Devices that use lights or vibrations. These alert you to the doorbell, a ringing telephone, or a baby monitor.  Television closed-captioning. This shows the words at the bottom of the screen. Most new TVs can do this.  TTY (text telephone). This lets you type messages back and forth on the telephone instead of talking or listening. These devices are also called TDD. When messages are typed on the keyboard, they are sent over the phone line to a receiving TTY. The message is shown on a monitor.  Use text messaging, social media, and email if it is hard for you to communicate by telephone.  Try to learn a listening technique called speechreading. It is not lipreading. You pay attention to people's gestures, expressions, posture, and tone of voice. These clues can help you understand what a person is saying. Face the person you are talking to, and have them face you. Make sure the lighting is good. You need to see the other person's face clearly.  Think about counseling if you need help to adjust to your hearing loss.  When should you call for help?  Watch closely for changes in your health, and be sure to contact your doctor if:    You think your hearing is getting worse.     You have new symptoms, such as dizziness or nausea.   Where can you learn more?  Go to https://www.healthwise.net/patiented  Enter R798 in the search box to learn more about  "\"Hearing Loss: Care Instructions.\"  Current as of: September 27, 2023               Content Version: 14.0    1229-1353 [employer].   Care instructions adapted under license by your healthcare professional. If you have questions about a medical condition or this instruction, always ask your healthcare professional. [employer] disclaims any warranty or liability for your use of this information.      Bladder Training: Care Instructions  Your Care Instructions     Bladder training is used to treat urge incontinence and stress incontinence. Urge incontinence means that the need to urinate comes on so fast that you can't get to a toilet in time. Stress incontinence means that you leak urine because of pressure on your bladder. For example, it may happen when you laugh, cough, or lift something heavy.  Bladder training can increase how long you can wait before you have to urinate. It can also help your bladder hold more urine. And it can give you better control over the urge to urinate.  It is important to remember that bladder training takes a few weeks to a few months to make a difference. You may not see results right away, but don't give up.  Follow-up care is a key part of your treatment and safety. Be sure to make and go to all appointments, and call your doctor if you are having problems. It's also a good idea to know your test results and keep a list of the medicines you take.  How can you care for yourself at home?  Work with your doctor to come up with a bladder training program that is right for you. You may use one or more of the following methods.  Delayed urination  In the beginning, try to keep from urinating for 5 minutes after you first feel the need to go.  While you wait, take deep, slow breaths to relax. Kegel exercises can also help you delay the need to go to the bathroom.  After some practice, when you can easily wait 5 minutes to urinate, try to wait 10 minutes before " "you urinate.  Slowly increase the waiting period until you are able to control when you have to urinate.  Scheduled urination  Empty your bladder when you first wake up in the morning.  Schedule times throughout the day when you will urinate.  Start by going to the bathroom every hour, even if you don't need to go.  Slowly increase the time between trips to the bathroom.  When you have found a schedule that works well for you, keep doing it.  If you wake up during the night and have to urinate, do it. Apply your schedule to waking hours only.  Kegel exercises  These tighten and strengthen pelvic muscles, which can help you control the flow of urine. (If doing these exercises causes pain, stop doing them and talk with your doctor.) To do Kegel exercises:  Squeeze your muscles as if you were trying not to pass gas. Or squeeze your muscles as if you were stopping the flow of urine. Your belly, legs, and buttocks shouldn't move.  Hold the squeeze for 3 seconds, then relax for 5 to 10 seconds.  Start with 3 seconds, then add 1 second each week until you are able to squeeze for 10 seconds.  Repeat the exercise 10 times a session. Do 3 to 8 sessions a day.  When should you call for help?  Watch closely for changes in your health, and be sure to contact your doctor if:    Your incontinence is getting worse.     You do not get better as expected.   Where can you learn more?  Go to https://www.sifonr.net/patiented  Enter V684 in the search box to learn more about \"Bladder Training: Care Instructions.\"  Current as of: November 15, 2023               Content Version: 14.0    7144-7852 [employer].   Care instructions adapted under license by your healthcare professional. If you have questions about a medical condition or this instruction, always ask your healthcare professional. [employer] disclaims any warranty or liability for your use of this information.      Substance Use Disorder: Care " Instructions  Overview     You can improve your life and health by stopping your use of alcohol or drugs. When you don't drink or use drugs, you may feel and sleep better. You may get along better with your family, friends, and coworkers. There are medicines and programs that can help with substance use disorder.  How can you care for yourself at home?  Here are some ways to help you stay sober and prevent relapse.  If you have been given medicine to help keep you sober or reduce your cravings, be sure to take it exactly as prescribed.  Talk to your doctor about programs that can help you stop using drugs or drinking alcohol.  Do not keep alcohol or drugs in your home.  Plan ahead. Think about what you'll say if other people ask you to drink or use drugs. Try not to spend time with people who drink or use drugs.  Use the time and money spent on drinking or drugs to do something that's important to you.  Preventing a relapse  Have a plan to deal with relapse. Learn to recognize changes in your thinking that lead you to drink or use drugs. Get help before you start to drink or use drugs again.  Try to stay away from situations, friends, or places that may lead you to drink or use drugs.  If you feel the need to drink alcohol or use drugs again, seek help right away. Call a trusted friend or family member. Some people get support from organizations such as Narcotics Anonymous or [employer] or from treatment facilities.  If you relapse, get help as soon as you can. Some people make a plan with another person that outlines what they want that person to do for them if they relapse. The plan usually includes how to handle the relapse and who to notify in case of relapse.  Don't give up. Remember that a relapse doesn't mean that you have failed. Use the experience to learn the triggers that lead you to drink or use drugs. Then quit again. Recovery is a lifelong process. Many people have several relapses before they are  "able to quit for good.  Follow-up care is a key part of your treatment and safety. Be sure to make and go to all appointments, and call your doctor if you are having problems. It's also a good idea to know your test results and keep a list of the medicines you take.  When should you call for help?   Call 911  anytime you think you may need emergency care. For example, call if you or someone else:    Has overdosed or has withdrawal signs. Be sure to tell the emergency workers that you are or someone else is using or trying to quit using drugs. Overdose or withdrawal signs may include:  Losing consciousness.  Seizure.  Seeing or hearing things that aren't there (hallucinations).     Is thinking or talking about suicide or harming others.   Where to get help 24 hours a day, 7 days a week   If you or someone you know talks about suicide, self-harm, a mental health crisis, a substance use crisis, or any other kind of emotional distress, get help right away. You can:    Call the Suicide and Crisis Lifeline at 978.     Call 8-089-063-VUUR (1-793.714.3377).     Text HOME to 356754 to access the Crisis Text Line.   Consider saving these numbers in your phone.  Go to [employer] for more information or to chat online.  Call your doctor now or seek immediate medical care if:    You are having withdrawal symptoms. These may include nausea or vomiting, sweating, shakiness, and anxiety.   Watch closely for changes in your health, and be sure to contact your doctor if:    You have a relapse.     You need more help or support to stop.   Where can you learn more?  Go to https://www.healthTeleUP Inc[employer]net/patiented  Enter H573 in the search box to learn more about \"Substance Use Disorder: Care Instructions.\"  Current as of: November 15, 2023               Content Version: 14.0    7410-9851 [employer].   Care instructions adapted under license by your healthcare professional. If you have questions about a medical condition " or this instruction, always ask your healthcare professional. [employer], [employer] disclaims any warranty or liability for your use of this information.      Chronic Pain: Care Instructions  Your Care Instructions     Chronic pain is pain that lasts a long time (months or even years) and may or may not have a clear cause. It is different from acute pain, which usually does have a clear cause--like an injury or illness--and gets better over time. Chronic pain:  Lasts over time but may vary from day to day.  Does not go away despite efforts to end it.  May disrupt your sleep and lead to fatigue.  May cause depression or anxiety.  May make your muscles tense, causing more pain.  Can disrupt your work, hobbies, home life, and relationships with friends and family.  Chronic pain is a very real condition. It is not just in your head. Treatment can help and usually includes several methods used together, such as medicines, physical therapy, exercise, and other treatments. Learning how to relax and changing negative thought patterns can also help you cope.  Chronic pain is complex. Taking an active role in your treatment will help you better manage your pain. Tell your doctor if you have trouble dealing with your pain. You may have to try several things before you find what works best for you.  Follow-up care is a key part of your treatment and safety. Be sure to make and go to all appointments, and call your doctor if you are having problems. It's also a good idea to know your test results and keep a list of the medicines you take.  How can you care for yourself at home?  Pace yourself. Break up large jobs into smaller tasks. Save harder tasks for days when you have less pain, or go back and forth between hard tasks and easier ones. Take rest breaks.  Relax, and reduce stress. Relaxation techniques such as deep breathing or meditation can help.  Keep moving. Gentle, daily exercise can help reduce pain over the long  run. Try low- or no-impact exercises such as walking, swimming, and stationary biking. Do stretches to stay flexible.  Try heat, cold packs, and massage.  Get enough sleep. Chronic pain can make you tired and drain your energy. Talk with your doctor if you have trouble sleeping because of pain.  Think positive. Your thoughts can affect your pain level. Do things that you enjoy to distract yourself when you have pain instead of focusing on the pain. See a movie, read a book, listen to music, or spend time with a friend.  If you think you are depressed, talk to your doctor about treatment.  Keep a daily pain diary. Record how your moods, thoughts, sleep patterns, activities, and medicine affect your pain. You may find that your pain is worse during or after certain activities or when you are feeling a certain emotion. Having a record of your pain can help you and your doctor find the best ways to treat your pain.  Take pain medicines exactly as directed.  If the doctor gave you a prescription medicine for pain, take it as prescribed.  If you are not taking a prescription pain medicine, ask your doctor if you can take an over-the-counter medicine.  Reducing constipation caused by pain medicine  Talk to your doctor about a laxative. If a laxative doesn't work, your doctor may suggest a prescription medicine.  Include fruits, vegetables, beans, and whole grains in your diet each day. These foods are high in fiber.  If your doctor recommends it, get more exercise. Walking is a good choice. Bit by bit, increase the amount you walk every day. Try for at least 30 minutes on most days of the week.  Schedule time each day for a bowel movement. A daily routine may help. Take your time and do not strain when having a bowel movement.  When should you call for help?   Call your doctor now or seek immediate medical care if:    Your pain gets worse or is out of control.     You feel down or blue, or you do not enjoy things like you  "once did. You may be depressed, which is common in people with chronic pain. Depression can be treated.     You have vomiting or cramps for more than 2 hours.   Watch closely for changes in your health, and be sure to contact your doctor if:    You cannot sleep because of pain.     You are very worried or anxious about your pain.     You have trouble taking your pain medicine.     You have any concerns about your pain medicine.     You have trouble with bowel movements, such as:  No bowel movement in 3 days.  Blood in the anal area, in your stool, or on the toilet paper.  Diarrhea for more than 24 hours.   Where can you learn more?  Go to [email or web address]  Enter N004 in the search box to learn more about \"Chronic Pain: Care Instructions.\"  Current as of: July 10, 2023               Content Version: 14.0    5877-7013 [employer].   Care instructions adapted under license by your healthcare professional. If you have questions about a medical condition or this instruction, always ask your healthcare professional. [employer] disclaims any warranty or liability for your use of this information.      "

## 2024-04-24 LAB
ALBUMIN SERPL BCG-MCNC: 4.1 G/DL (ref 3.5–5.2)
ALP SERPL-CCNC: 93 U/L (ref 40–150)
ALT SERPL W P-5'-P-CCNC: 11 U/L (ref 0–50)
ANION GAP SERPL CALCULATED.3IONS-SCNC: 9 MMOL/L (ref 7–15)
AST SERPL W P-5'-P-CCNC: 16 U/L (ref 0–45)
BILIRUB SERPL-MCNC: 0.4 MG/DL
BUN SERPL-MCNC: 33.7 MG/DL (ref 8–23)
CALCIUM SERPL-MCNC: 9 MG/DL (ref 8.8–10.2)
CHLORIDE SERPL-SCNC: 104 MMOL/L (ref 98–107)
CHOLEST SERPL-MCNC: 190 MG/DL
CREAT SERPL-MCNC: 1.59 MG/DL (ref 0.51–0.95)
DEPRECATED HCO3 PLAS-SCNC: 26 MMOL/L (ref 22–29)
EGFRCR SERPLBLD CKD-EPI 2021: 31 ML/MIN/1.73M2
FASTING STATUS PATIENT QL REPORTED: NO
GLUCOSE SERPL-MCNC: 157 MG/DL (ref 70–99)
HDLC SERPL-MCNC: 30 MG/DL
LDLC SERPL CALC-MCNC: 88 MG/DL
NONHDLC SERPL-MCNC: 160 MG/DL
POTASSIUM SERPL-SCNC: 5.1 MMOL/L (ref 3.4–5.3)
PROT SERPL-MCNC: 7.1 G/DL (ref 6.4–8.3)
SODIUM SERPL-SCNC: 139 MMOL/L (ref 135–145)
TRIGL SERPL-MCNC: 359 MG/DL

## 2024-04-26 ENCOUNTER — TELEPHONE (OUTPATIENT)
Dept: FAMILY MEDICINE | Facility: CLINIC | Age: 84
End: 2024-04-26
Payer: COMMERCIAL

## 2024-04-26 NOTE — TELEPHONE ENCOUNTER
Completed forms received from Faith for the patient to go to an adult day program. Health summary was included and faxed to the number on the form.

## 2024-06-04 ENCOUNTER — PATIENT OUTREACH (OUTPATIENT)
Dept: GERIATRIC MEDICINE | Facility: CLINIC | Age: 84
End: 2024-06-04
Payer: COMMERCIAL

## 2024-06-04 NOTE — PROGRESS NOTES
[facility] Care Coordination Contact    Called adult daughter jett  to schedule annual HRA home visit. HRA has been scheduled for 6/14/24.    Jenn Tena RN PHN  [facility]  Care Coordinator  [phone]

## 2024-06-06 ENCOUNTER — TELEPHONE (OUTPATIENT)
Dept: FAMILY MEDICINE | Facility: CLINIC | Age: 84
End: 2024-06-06
Payer: COMMERCIAL

## 2024-06-06 DIAGNOSIS — E53.8 VITAMIN B12 DEFICIENCY (NON ANEMIC): ICD-10-CM

## 2024-06-06 DIAGNOSIS — E11.40 TYPE 2 DIABETES MELLITUS WITH DIABETIC NEUROPATHY, WITHOUT LONG-TERM CURRENT USE OF INSULIN (H): Primary | ICD-10-CM

## 2024-06-06 NOTE — TELEPHONE ENCOUNTER
Incoming call from daughter Amarilys,     Medication Question or Refill      What medication are you calling about (include dose and sig)?: Vitamin B12 (will be getting it outpatient)     Preferred Pharmacy:   [facility] [address] [place] [id number]  Phone: [phone] Fax: [phone]      Controlled Substance Agreement on file:   CSA -- Patient Level:    CSA: None found at the patient level.       When did you use the medication last? Never     Do you have any questions or concerns?  Yes: daughter is wondering if patient can get a vitamin B12 while taking her prescribed medications.     If provider think it is appropriate, she plan to get it outpatient.        Call back number: [phone]     Jesus MENSAH RN

## 2024-06-07 NOTE — TELEPHONE ENCOUNTER
Outgoing call to daughter, relayed PCP's message regarding the need for a lab first.     Scheduled lab appointment for next Friday at daughter request.     Macy TALAVERA RN

## 2024-06-07 NOTE — TELEPHONE ENCOUNTER
Please call.    We have never checked a level on you.  I don't know if a supplement is appropriate.  She can come and get a level checked, orders placed.  Otherwise, I cannot prescribe it.    Faith Genao NP

## 2024-06-14 ENCOUNTER — PATIENT OUTREACH (OUTPATIENT)
Dept: GERIATRIC MEDICINE | Facility: CLINIC | Age: 84
End: 2024-06-14

## 2024-06-14 ENCOUNTER — LAB (OUTPATIENT)
Dept: LAB | Facility: CLINIC | Age: 84
End: 2024-06-14
Payer: COMMERCIAL

## 2024-06-14 DIAGNOSIS — E11.40 TYPE 2 DIABETES MELLITUS WITH DIABETIC NEUROPATHY, WITHOUT LONG-TERM CURRENT USE OF INSULIN (H): ICD-10-CM

## 2024-06-14 PROCEDURE — 36415 COLL VENOUS BLD VENIPUNCTURE: CPT

## 2024-06-14 PROCEDURE — 82607 VITAMIN B-12: CPT

## 2024-06-14 NOTE — Clinical Note
Home visit completed for annual health risk and PCA reassessment.  May denied any changes or concerns.  No falls/injuries or hospital/ED reported.  Family remain good informal support.     Jenn Tena RN [facility] Coordinator [phone]

## 2024-06-14 NOTE — PROGRESS NOTES
[facility] Care Coordination Contact    [facility] Home Visit Assessment     Home visit for Health Risk Assessment with Tracie Yen completed on June 14, 2024    Type of residence:: Private home - stairs  Current living arrangement:: I live in a private home with family     Assessment completed with:: Patient, Children    Current Care Plan  Member currently receiving the following home care services:     Member currently receiving the following community resources: PCA, Day Care, DME    Medication Review  Medication reconciliation completed in Epic: Yes  Medication set-up & administration: Family/informal caregiver sets up weekly.  Family caregiver administers medications.  Medication Risk Assessment Medication (1 or more, place referral to MTM): N/A: No risk factors identified  MTM Referral Placed: No: No risk factors idenified    Mental/Behavioral Health   Depression Screening:   PHQ-2 Total Score (Adult) - Positive if 3 or more points; Administer PHQ-9 if positive: 1       Mental health DX:: No        Falls Assessment:   Fallen 2 or more times in the past year?: No   Any fall with injury in the past year?: No    ADL/IADL Dependencies:   Dependent ADLs:: Ambulation-cane, Bathing, Dressing, Eating, Incontinence, Positioning, Transfers, Toileting  Dependent IADLs:: Cleaning, Cooking, Laundry, Shopping, Meal Preparation, Medication Management, Money Management, Transportation, Incontinence    Health Plan sponsored benefits: [facility]: Shared information regarding [employer]. Reviewed preventative health screening and health plan supplemental benefits/incentives. Reviewed medication disposal form.    PCA Assessment completed at visit: Yes Annual PCA assessment indicated 4 hours per day of PCA. This is the same as the previous assessment.     Elderly Waiver Eligibility: Yes-will continue on EW    Care Plan & Recommendations: Continue with PCA, hmking, adult day care with transportation.   Family will continue to assist with medication management including set up and daily administration.  Family will continue to help schedule and provide transportation to medical appointments.  Tracie states she is doing well and denied any changes or health concerns.  No hospital/ED or falls/injuries.  Family remain good support system. Encouraged continued medication compliance and daily exercise to help lower A1C levels.     See [facility] for detailed assessment information.    Follow-Up Plan: Member informed of future contact, plan to f/u with member with a 6 month telephone assessment.  Contact information shared with member and family, encouraged member to call with any questions or concerns at any time.    [facility] care continuum providers: Please see Snapshot and Care Management Flowsheets for Specific details of care plan.    This CC note routed to PCP, Faith Genao.    Jenn Tena RN PHN  [facility]  Care Coordinator  [phone]

## 2024-06-15 LAB — VIT B12 SERPL-MCNC: 172 PG/ML (ref 232–1245)

## 2024-06-17 ENCOUNTER — PATIENT OUTREACH (OUTPATIENT)
Dept: GERIATRIC MEDICINE | Facility: CLINIC | Age: 84
End: 2024-06-17
Payer: COMMERCIAL

## 2024-06-17 RX ORDER — LANOLIN ALCOHOL/MO/W.PET/CERES
1000 CREAM (GRAM) TOPICAL DAILY
Qty: 90 TABLET | Refills: 3 | Status: SHIPPED | OUTPATIENT
Start: 2024-06-17 | End: 2024-06-18

## 2024-06-17 NOTE — LETTER
June 17, 2024      FELICITA CARBONE  [address] [address]      Dear Felicita:    At [facility], we re dedicated to improving your health and wellness. Enclosed is the Care Plan developed with you on 6/14/2024. Please review the Care Plan carefully.    As a reminder, during your visit we talked about:  Ways to manage your physical and mental health  Using health care to maintain and improve your health   Your preventive care needs     Remember to contact your care coordinator if you:  Are hospitalized, or plan to be hospitalized   Have a fall    Have a change in your physical or mental health  Need help finding support or services    If you have questions, or don t agree with your Care Plan, call me at [phone]. You can also call me if your needs change. TTY users, call the [place] [employer] at (623) or 1-386.937.9744 (yfbqxh-wz-rbqhtt relay service).    Sincerely,      Jenn Tena RN, PHN  [phone]  [email or web address] ([facility]) is a health plan that contracts with both Medicare and the [place] Medical Assistance (Medicaid) program to provide benefits of both programs to enrollees. Enrollment in [facility] depends on contract renewal.    [id number] accepted    [id number] (07/2022)

## 2024-06-17 NOTE — PROGRESS NOTES
[facility] Care Coordination Contact    Received after visit chart from care coordinator.  Completed following tasks: Mailed copy of care plan/support plan to member, Mailed [place] signature sheet pages 3-4, Mailed Safe Medication Disposal , Submitted referrals/auths for ADC w/rides & Hmkg, and Updated services in Database  , Provider Signature - No POC Shared:  Member indicates that they do not want their POC shared with any EW providers.    , and [facility]:  Emailed required PCA documents to [facility].  Faxed copy of PCA assessment to PCA Agency and mailed copy to member.  Faxed MD Communication to PCP.     Lalitha Mccann  Case Management Specialist  [facility]  [phone]

## 2024-06-18 ENCOUNTER — TELEPHONE (OUTPATIENT)
Dept: FAMILY MEDICINE | Facility: CLINIC | Age: 84
End: 2024-06-18
Payer: COMMERCIAL

## 2024-06-18 DIAGNOSIS — E53.8 VITAMIN B12 DEFICIENCY (NON ANEMIC): Primary | ICD-10-CM

## 2024-06-18 RX ORDER — CYANOCOBALAMIN 1000 UG/ML
1000 INJECTION, SOLUTION INTRAMUSCULAR; SUBCUTANEOUS
Status: ACTIVE | OUTPATIENT
Start: 2024-06-18 | End: 2025-06-13

## 2024-06-18 NOTE — TELEPHONE ENCOUNTER
----- Message from Faith Genao NP sent at 6/17/2024  8:28 AM CDT -----  Please call.    Tracie's vitamin B12 is low.  I will send in a supplement to take.    Faith Genao NP    
Patient's daughter states she will call back to schedule B12 injection.  
Please call.    Patient can do B12 injections if she prefers.  Do not take an oral supplement.  Orders placed.  Please schedule an MA visit to have first injection.  She can do this monthly.    Faith Genao NP    
Please read result notes  
Yes

## 2024-07-08 ENCOUNTER — ALLIED HEALTH/NURSE VISIT (OUTPATIENT)
Dept: FAMILY MEDICINE | Facility: CLINIC | Age: 84
End: 2024-07-08
Payer: COMMERCIAL

## 2024-07-08 DIAGNOSIS — E53.8 VITAMIN B12 DEFICIENCY (NON ANEMIC): Primary | ICD-10-CM

## 2024-07-08 PROCEDURE — 96372 THER/PROPH/DIAG INJ SC/IM: CPT | Performed by: NURSE PRACTITIONER

## 2024-07-08 PROCEDURE — 99207 PR NO CHARGE NURSE ONLY: CPT

## 2024-07-08 RX ADMIN — CYANOCOBALAMIN 1000 MCG: 1000 INJECTION, SOLUTION INTRAMUSCULAR; SUBCUTANEOUS at 10:54

## 2024-08-12 ENCOUNTER — ALLIED HEALTH/NURSE VISIT (OUTPATIENT)
Dept: FAMILY MEDICINE | Facility: CLINIC | Age: 84
End: 2024-08-12
Payer: COMMERCIAL

## 2024-08-12 DIAGNOSIS — E53.8 VITAMIN B12 DEFICIENCY (NON ANEMIC): Primary | ICD-10-CM

## 2024-08-12 PROCEDURE — 96372 THER/PROPH/DIAG INJ SC/IM: CPT | Performed by: NURSE PRACTITIONER

## 2024-08-12 PROCEDURE — 99207 PR NO CHARGE NURSE ONLY: CPT

## 2024-08-12 RX ADMIN — CYANOCOBALAMIN 1000 MCG: 1000 INJECTION, SOLUTION INTRAMUSCULAR; SUBCUTANEOUS at 09:56

## 2024-08-12 NOTE — PROGRESS NOTES
Tracie is here today for her B12 injection. She is within the expected time frame. Injection given without incident. See MAR for administration details.   Consuelo Michel CSS b

## 2024-09-16 ENCOUNTER — ALLIED HEALTH/NURSE VISIT (OUTPATIENT)
Dept: FAMILY MEDICINE | Facility: CLINIC | Age: 84
End: 2024-09-16
Payer: COMMERCIAL

## 2024-09-16 DIAGNOSIS — E53.8 VITAMIN B12 DEFICIENCY (NON ANEMIC): Primary | ICD-10-CM

## 2024-09-16 PROCEDURE — 96372 THER/PROPH/DIAG INJ SC/IM: CPT | Performed by: NURSE PRACTITIONER

## 2024-09-16 PROCEDURE — 99207 PR NO CHARGE NURSE ONLY: CPT

## 2024-09-16 RX ADMIN — CYANOCOBALAMIN 1000 MCG: 1000 INJECTION, SOLUTION INTRAMUSCULAR; SUBCUTANEOUS at 09:06

## 2024-09-26 DIAGNOSIS — R32 URINARY INCONTINENCE, UNSPECIFIED TYPE: Primary | ICD-10-CM

## 2024-09-27 RX ORDER — CALCIUM CARBONATE/VITAMIN D3 600 MG-10
TABLET ORAL
Qty: 100 EACH | Refills: 4 | Status: SHIPPED | OUTPATIENT
Start: 2024-09-27

## 2024-10-25 ENCOUNTER — OFFICE VISIT (OUTPATIENT)
Dept: FAMILY MEDICINE | Facility: CLINIC | Age: 84
End: 2024-10-25
Payer: COMMERCIAL

## 2024-10-25 VITALS
DIASTOLIC BLOOD PRESSURE: 48 MMHG | SYSTOLIC BLOOD PRESSURE: 112 MMHG | BODY MASS INDEX: 28.35 KG/M2 | WEIGHT: 131 LBS | RESPIRATION RATE: 16 BRPM | OXYGEN SATURATION: 99 % | TEMPERATURE: 97 F | HEART RATE: 73 BPM

## 2024-10-25 DIAGNOSIS — I10 BENIGN ESSENTIAL HYPERTENSION: ICD-10-CM

## 2024-10-25 DIAGNOSIS — E11.40 TYPE 2 DIABETES MELLITUS WITH DIABETIC NEUROPATHY, WITHOUT LONG-TERM CURRENT USE OF INSULIN (H): Primary | ICD-10-CM

## 2024-10-25 DIAGNOSIS — N18.32 CHRONIC KIDNEY DISEASE, STAGE 3B (H): ICD-10-CM

## 2024-10-25 LAB
CREAT UR-MCNC: 106 MG/DL
ERYTHROCYTE [DISTWIDTH] IN BLOOD BY AUTOMATED COUNT: 14 % (ref 10–15)
EST. AVERAGE GLUCOSE BLD GHB EST-MCNC: 186 MG/DL
HBA1C MFR BLD: 8.1 % (ref 0–5.6)
HCT VFR BLD AUTO: 39.3 % (ref 35–47)
HGB BLD-MCNC: 12.5 G/DL (ref 11.7–15.7)
MCH RBC QN AUTO: 27.7 PG (ref 26.5–33)
MCHC RBC AUTO-ENTMCNC: 31.8 G/DL (ref 31.5–36.5)
MCV RBC AUTO: 87 FL (ref 78–100)
MICROALBUMIN UR-MCNC: 23.6 MG/L
MICROALBUMIN/CREAT UR: 22.26 MG/G CR (ref 0–25)
PLATELET # BLD AUTO: 219 10E3/UL (ref 150–450)
RBC # BLD AUTO: 4.51 10E6/UL (ref 3.8–5.2)
WBC # BLD AUTO: 7.3 10E3/UL (ref 4–11)

## 2024-10-25 PROCEDURE — 99214 OFFICE O/P EST MOD 30 MIN: CPT | Performed by: NURSE PRACTITIONER

## 2024-10-25 PROCEDURE — 82570 ASSAY OF URINE CREATININE: CPT | Performed by: NURSE PRACTITIONER

## 2024-10-25 PROCEDURE — 85027 COMPLETE CBC AUTOMATED: CPT | Performed by: NURSE PRACTITIONER

## 2024-10-25 PROCEDURE — G2211 COMPLEX E/M VISIT ADD ON: HCPCS | Performed by: NURSE PRACTITIONER

## 2024-10-25 PROCEDURE — 82043 UR ALBUMIN QUANTITATIVE: CPT | Performed by: NURSE PRACTITIONER

## 2024-10-25 PROCEDURE — G0008 ADMIN INFLUENZA VIRUS VAC: HCPCS | Performed by: NURSE PRACTITIONER

## 2024-10-25 PROCEDURE — 36415 COLL VENOUS BLD VENIPUNCTURE: CPT | Performed by: NURSE PRACTITIONER

## 2024-10-25 PROCEDURE — 80048 BASIC METABOLIC PNL TOTAL CA: CPT | Performed by: NURSE PRACTITIONER

## 2024-10-25 PROCEDURE — 83036 HEMOGLOBIN GLYCOSYLATED A1C: CPT | Performed by: NURSE PRACTITIONER

## 2024-10-25 PROCEDURE — 90662 IIV NO PRSV INCREASED AG IM: CPT | Performed by: NURSE PRACTITIONER

## 2024-10-25 RX ORDER — BLOOD PRESSURE TEST KIT
KIT MISCELLANEOUS
Qty: 100 EACH | Refills: 0 | Status: SHIPPED | OUTPATIENT
Start: 2024-10-25

## 2024-10-25 RX ADMIN — CYANOCOBALAMIN 1000 MCG: 1000 INJECTION, SOLUTION INTRAMUSCULAR; SUBCUTANEOUS at 16:18

## 2024-10-25 NOTE — PROGRESS NOTES
"  Assessment & Plan     Type 2 diabetes mellitus with diabetic neuropathy, without long-term current use of insulin (H)  Hgb A1c stable at 8.1.  Continue same medications.  Follow up in 6 months.   - Hemoglobin A1c  - CBC with platelets  - Albumin Random Urine Quantitative with Creat Ratio  - FOOT EXAM  - Basic metabolic panel  - Alcohol Swabs PADS  Dispense: 100 each; Refill: 0    Benign essential hypertension  Well controlled on Losartan.    Chronic kidney disease, stage 3b (H)  Labs pending.       The longitudinal plan of care for the diagnosis(es)/condition(s) as documented were addressed during this visit. Due to the added complexity in care, I will continue to support Tracie in the subsequent management and with ongoing continuity of care.        BMI  Estimated body mass index is 28.35 kg/m  as calculated from the following:    Height as of 4/23/24: 1.448 m (4' 9\").    Weight as of this encounter: 59.4 kg (131 lb).   Weight management plan: Discussed healthy diet and exercise guidelines        Subjective   Tracie is a 83 year old accompanied by her daughter who presents for diabetic check.    Patient on Jardiance, Tradjenta, metformin, and Actos for her diabetes.  She does not have a log of her blood sugars with her today.  Daughter says her blood sugars are \"up-and-down\" depending on what she eats the night before.  They are requesting alcohol pads to check her blood sugars.  She continues to get some neuropathy in her big toes.  This seems stable on pregabalin.    Patient has no complaints or concerns today.    Follow Up (DM 2 ; B12 Shots ; requesting alcohol wipes)      History of Present Illness       Reason for visit:  Shot   She is taking medications regularly.           Objective    /48 (BP Location: Right arm, Patient Position: Sitting, Cuff Size: Adult Regular)   Pulse 73   Temp 97  F (36.1  C) (Temporal)   Resp 16   Wt 59.4 kg (131 lb)   SpO2 99%   BMI 28.35 kg/m    Body mass index is 28.35 " kg/m .  Physical Exam   GENERAL: alert and no distress  NECK: no adenopathy, no asymmetry, masses, or scars  RESP: lungs clear to auscultation - no rales, rhonchi or wheezes  CV: regular rate and rhythm, normal S1 S2, no S3 or S4, no murmur, click or rub, no peripheral edema   ABDOMEN: soft, nontender, no hepatosplenomegaly, no masses and bowel sounds normal  Diabetic foot exam: normal DP and PT pulses, no trophic changes or ulcerative lesions, and normal sensory exam            Signed Electronically by: Faith Genao NP

## 2024-10-25 NOTE — LETTER
October 29, 2024      Tracie Yen  [address] [address]        Dear ,    We are writing to inform you of your test results.    Hgb A1c is stable at 8.1.  Continue current diabetic medications.     Normal blood counts.     Kidney function has declined very slightly.  We will continue to monitor this with routine labs.     Follow up in 6 months as discussed.  Please let me know if you have any questions.     Resulted Orders   Hemoglobin A1c   Result Value Ref Range    Estimated Average Glucose 186 (H) <117 mg/dL    Hemoglobin A1C 8.1 (H) 0.0 - 5.6 %      Comment:      Normal <5.7%   Prediabetes 5.7-6.4%    Diabetes 6.5% or higher     Note: Adopted from ADA consensus guidelines.    Narrative    Results consistent with previous, repeat testing unnecessary     CBC with platelets   Result Value Ref Range    WBC Count 7.3 4.0 - 11.0 10e3/uL    RBC Count 4.51 3.80 - 5.20 10e6/uL    Hemoglobin 12.5 11.7 - 15.7 g/dL    Hematocrit 39.3 35.0 - 47.0 %    MCV 87 78 - 100 fL    MCH 27.7 26.5 - 33.0 pg    MCHC 31.8 31.5 - 36.5 g/dL    RDW 14.0 10.0 - 15.0 %    Platelet Count 219 150 - 450 10e3/uL   Albumin Random Urine Quantitative with Creat Ratio   Result Value Ref Range    Creatinine Urine mg/dL 106.0 mg/dL      Comment:      The reference ranges have not been established in urine creatinine. The results should be integrated into the clinical context for interpretation.    Albumin Urine mg/L 23.6 mg/L      Comment:      The reference ranges have not been established in urine albumin. The results should be integrated into the clinical context for interpretation.    Albumin Urine mg/g Cr 22.26 0.00 - 25.00 mg/g Cr      Comment:      Microalbuminuria is defined as an albumin:creatinine ratio of 17 to 299 for males and 25 to 299 for females. A ratio of albumin:creatinine of 300 or higher is indicative of overt proteinuria.  Due to biologic variability, positive results should be confirmed by a second,  first-morning random or 24-hour timed urine specimen. If there is discrepancy, a third specimen is recommended. When 2 out of 3 results are in the microalbuminuria range, this is evidence for incipient nephropathy and warrants increased efforts at glucose control, blood pressure control, and institution of therapy with an angiotensin-converting-enzyme (ACE) inhibitor (if the patient can tolerate it).     Basic metabolic panel   Result Value Ref Range    Sodium 138 135 - 145 mmol/L    Potassium 4.5 3.4 - 5.3 mmol/L    Chloride 103 98 - 107 mmol/L    Carbon Dioxide (CO2) 23 22 - 29 mmol/L    Anion Gap 12 7 - 15 mmol/L    Urea Nitrogen 30.1 (H) 8.0 - 23.0 mg/dL    Creatinine 1.76 (H) 0.51 - 0.95 mg/dL    GFR Estimate 28 (L) >60 mL/min/1.73m2      Comment:      eGFR calculated using 2021 CKD-EPI equation.    Calcium 8.7 (L) 8.8 - 10.4 mg/dL      Comment:      Reference intervals for this test were updated on 7/16/2024 to reflect our healthy population more accurately. There may be differences in the flagging of prior results with similar values performed with this method. Those prior results can be interpreted in the context of the updated reference intervals.    Glucose 217 (H) 70 - 99 mg/dL         Sincerely,      Faith Genao NP

## 2024-10-26 LAB
ANION GAP SERPL CALCULATED.3IONS-SCNC: 12 MMOL/L (ref 7–15)
BUN SERPL-MCNC: 30.1 MG/DL (ref 8–23)
CALCIUM SERPL-MCNC: 8.7 MG/DL (ref 8.8–10.4)
CHLORIDE SERPL-SCNC: 103 MMOL/L (ref 98–107)
CREAT SERPL-MCNC: 1.76 MG/DL (ref 0.51–0.95)
EGFRCR SERPLBLD CKD-EPI 2021: 28 ML/MIN/1.73M2
GLUCOSE SERPL-MCNC: 217 MG/DL (ref 70–99)
HCO3 SERPL-SCNC: 23 MMOL/L (ref 22–29)
POTASSIUM SERPL-SCNC: 4.5 MMOL/L (ref 3.4–5.3)
SODIUM SERPL-SCNC: 138 MMOL/L (ref 135–145)

## 2024-12-10 DIAGNOSIS — G62.9 PERIPHERAL POLYNEUROPATHY: ICD-10-CM

## 2024-12-10 RX ORDER — PREGABALIN 50 MG/1
50 CAPSULE ORAL DAILY
Qty: 90 CAPSULE | Refills: 0 | Status: SHIPPED | OUTPATIENT
Start: 2024-12-10

## 2024-12-12 ENCOUNTER — PATIENT OUTREACH (OUTPATIENT)
Dept: GERIATRIC MEDICINE | Facility: CLINIC | Age: 84
End: 2024-12-12
Payer: COMMERCIAL

## 2024-12-12 NOTE — PROGRESS NOTES
[facility] Care Coordination Contact      [facility] Six-Month Telephone Assessment    6 month telephone assessment completed on 12/12/24.    ER visits: No  Hospitalizations: No  TCU stays: No  Significant health status changes: None reported   Falls/Injuries: No  ADL/IADL changes: No  Changes in services: No    Caregiver Assessment follow up:  NA    Goals: See Support Plan for goal progress documentation.      Will see member in 6 months for an annual health risk assessment.   Encouraged member to call CC with any questions or concerns in the meantime.     Jenn Tena RN PHN  [facility]  Care Coordinator  [phone]

## 2025-01-06 DIAGNOSIS — R32 URINARY INCONTINENCE, UNSPECIFIED TYPE: ICD-10-CM

## 2025-01-06 RX ORDER — CALCIUM CARBONATE/VITAMIN D3 600 MG-10
TABLET ORAL
Qty: 100 EACH | Refills: 4 | Status: SHIPPED | OUTPATIENT
Start: 2025-01-06

## 2025-04-01 ENCOUNTER — TRANSFERRED RECORDS (OUTPATIENT)
Dept: MULTI SPECIALTY CLINIC | Facility: CLINIC | Age: 85
End: 2025-04-01

## 2025-04-01 LAB — RETINOPATHY: NORMAL

## 2025-04-06 DIAGNOSIS — G62.9 PERIPHERAL POLYNEUROPATHY: ICD-10-CM

## 2025-04-07 RX ORDER — PREGABALIN 50 MG/1
50 CAPSULE ORAL DAILY
Qty: 90 CAPSULE | Refills: 0 | Status: SHIPPED | OUTPATIENT
Start: 2025-04-07

## 2025-04-23 ENCOUNTER — PATIENT OUTREACH (OUTPATIENT)
Dept: GERIATRIC MEDICINE | Facility: CLINIC | Age: 85
End: 2025-04-23
Payer: COMMERCIAL

## 2025-04-23 NOTE — PROGRESS NOTES
[facility] Care Coordination Contact    Called adult daughter Wes Ho  to schedule annual HRA home visit. HRA has been scheduled for 5/2/25.    Jenn Tena RN PHN  [facility]  Care Coordinator  [phone]

## 2025-05-03 DIAGNOSIS — E11.40 TYPE 2 DIABETES MELLITUS WITH DIABETIC NEUROPATHY, WITHOUT LONG-TERM CURRENT USE OF INSULIN (H): ICD-10-CM

## 2025-05-05 ENCOUNTER — PATIENT OUTREACH (OUTPATIENT)
Dept: GERIATRIC MEDICINE | Facility: CLINIC | Age: 85
End: 2025-05-05
Payer: COMMERCIAL

## 2025-05-05 RX ORDER — PIOGLITAZONE 45 MG/1
45 TABLET ORAL DAILY
Qty: 90 TABLET | Refills: 0 | Status: SHIPPED | OUTPATIENT
Start: 2025-05-05

## 2025-05-05 NOTE — PROGRESS NOTES
[facility] Care Coordination Contact    Received after visit chart from care coordinator.  Completed following tasks: Mailed copy of support [place] to member, Mailed [place] Cho[place] signature sheet pages 3-4, Mailed Safe Medication Disposal , Mailed Transition of Care Member Handout, and Updated services in Database.  , Provider Signature - No Support Plan Shared:  Member indicates that they do not want their support plan shared with any EW providers.Received after visit chart from care coordinator.  Completed following tasks: Submitted referrals/auths for ADC with transportation and [facility]    [facility]:  Emailed required CFSS documents to [facility].  Mailed member supplemental summary chart and assessment summary letter.     Dea Soto  Care Management Specialist  [facility]  [phone]

## 2025-06-03 ENCOUNTER — OFFICE VISIT (OUTPATIENT)
Dept: FAMILY MEDICINE | Facility: CLINIC | Age: 85
End: 2025-06-03
Payer: COMMERCIAL

## 2025-06-03 VITALS
BODY MASS INDEX: 27.41 KG/M2 | SYSTOLIC BLOOD PRESSURE: 117 MMHG | HEART RATE: 69 BPM | OXYGEN SATURATION: 94 % | TEMPERATURE: 98.1 F | HEIGHT: 58 IN | RESPIRATION RATE: 18 BRPM | WEIGHT: 130.6 LBS | DIASTOLIC BLOOD PRESSURE: 62 MMHG

## 2025-06-03 DIAGNOSIS — Z00.00 ENCOUNTER FOR MEDICARE ANNUAL WELLNESS EXAM: Primary | ICD-10-CM

## 2025-06-03 DIAGNOSIS — E53.8 VITAMIN B12 DEFICIENCY (NON ANEMIC): ICD-10-CM

## 2025-06-03 DIAGNOSIS — N18.32 CHRONIC KIDNEY DISEASE, STAGE 3B (H): ICD-10-CM

## 2025-06-03 DIAGNOSIS — N18.4 CKD (CHRONIC KIDNEY DISEASE) STAGE 4, GFR 15-29 ML/MIN (H): ICD-10-CM

## 2025-06-03 DIAGNOSIS — E78.2 MIXED HYPERLIPIDEMIA: ICD-10-CM

## 2025-06-03 DIAGNOSIS — I10 BENIGN ESSENTIAL HYPERTENSION: ICD-10-CM

## 2025-06-03 DIAGNOSIS — E11.40 TYPE 2 DIABETES MELLITUS WITH DIABETIC NEUROPATHY, WITHOUT LONG-TERM CURRENT USE OF INSULIN (H): ICD-10-CM

## 2025-06-03 DIAGNOSIS — H61.21 IMPACTED CERUMEN OF RIGHT EAR: ICD-10-CM

## 2025-06-03 DIAGNOSIS — G62.9 PERIPHERAL POLYNEUROPATHY: ICD-10-CM

## 2025-06-03 LAB
ALBUMIN SERPL BCG-MCNC: 3.9 G/DL (ref 3.5–5.2)
ALP SERPL-CCNC: 100 U/L (ref 40–150)
ALT SERPL W P-5'-P-CCNC: 10 U/L (ref 0–50)
ANION GAP SERPL CALCULATED.3IONS-SCNC: 10 MMOL/L (ref 7–15)
AST SERPL W P-5'-P-CCNC: 17 U/L (ref 0–45)
BILIRUB SERPL-MCNC: 0.3 MG/DL
BUN SERPL-MCNC: 37.7 MG/DL (ref 8–23)
CALCIUM SERPL-MCNC: 9.3 MG/DL (ref 8.8–10.4)
CHLORIDE SERPL-SCNC: 106 MMOL/L (ref 98–107)
CHOLEST SERPL-MCNC: 204 MG/DL
CREAT SERPL-MCNC: 1.69 MG/DL (ref 0.51–0.95)
CREAT UR-MCNC: 68.1 MG/DL
EGFRCR SERPLBLD CKD-EPI 2021: 29 ML/MIN/1.73M2
EST. AVERAGE GLUCOSE BLD GHB EST-MCNC: 171 MG/DL
FASTING STATUS PATIENT QL REPORTED: NO
FASTING STATUS PATIENT QL REPORTED: NO
GLUCOSE SERPL-MCNC: 214 MG/DL (ref 70–99)
HBA1C MFR BLD: 7.6 % (ref 0–5.6)
HCO3 SERPL-SCNC: 23 MMOL/L (ref 22–29)
HDLC SERPL-MCNC: 33 MG/DL
LDLC SERPL CALC-MCNC: 112 MG/DL
MICROALBUMIN UR-MCNC: 23.3 MG/L
MICROALBUMIN/CREAT UR: 34.21 MG/G CR (ref 0–25)
NONHDLC SERPL-MCNC: 171 MG/DL
PHOSPHATE SERPL-MCNC: 3.7 MG/DL (ref 2.5–4.5)
POTASSIUM SERPL-SCNC: 4.7 MMOL/L (ref 3.4–5.3)
PROT SERPL-MCNC: 6.7 G/DL (ref 6.4–8.3)
PTH-INTACT SERPL-MCNC: 56 PG/ML (ref 15–65)
SODIUM SERPL-SCNC: 139 MMOL/L (ref 135–145)
TRIGL SERPL-MCNC: 295 MG/DL
VIT B12 SERPL-MCNC: 307 PG/ML (ref 232–1245)

## 2025-06-03 PROCEDURE — 3078F DIAST BP <80 MM HG: CPT | Performed by: NURSE PRACTITIONER

## 2025-06-03 PROCEDURE — 69209 REMOVE IMPACTED EAR WAX UNI: CPT | Mod: RT | Performed by: NURSE PRACTITIONER

## 2025-06-03 PROCEDURE — 83036 HEMOGLOBIN GLYCOSYLATED A1C: CPT | Performed by: NURSE PRACTITIONER

## 2025-06-03 PROCEDURE — 80061 LIPID PANEL: CPT | Performed by: NURSE PRACTITIONER

## 2025-06-03 PROCEDURE — 84100 ASSAY OF PHOSPHORUS: CPT | Performed by: NURSE PRACTITIONER

## 2025-06-03 PROCEDURE — 83970 ASSAY OF PARATHORMONE: CPT | Performed by: NURSE PRACTITIONER

## 2025-06-03 PROCEDURE — 82570 ASSAY OF URINE CREATININE: CPT | Performed by: NURSE PRACTITIONER

## 2025-06-03 PROCEDURE — 99214 OFFICE O/P EST MOD 30 MIN: CPT | Mod: 25 | Performed by: NURSE PRACTITIONER

## 2025-06-03 PROCEDURE — G0439 PPPS, SUBSEQ VISIT: HCPCS | Performed by: NURSE PRACTITIONER

## 2025-06-03 PROCEDURE — 36415 COLL VENOUS BLD VENIPUNCTURE: CPT | Performed by: NURSE PRACTITIONER

## 2025-06-03 PROCEDURE — 82043 UR ALBUMIN QUANTITATIVE: CPT | Performed by: NURSE PRACTITIONER

## 2025-06-03 PROCEDURE — 80053 COMPREHEN METABOLIC PANEL: CPT | Performed by: NURSE PRACTITIONER

## 2025-06-03 PROCEDURE — 3049F LDL-C 100-129 MG/DL: CPT | Performed by: NURSE PRACTITIONER

## 2025-06-03 PROCEDURE — 3074F SYST BP LT 130 MM HG: CPT | Performed by: NURSE PRACTITIONER

## 2025-06-03 PROCEDURE — 3051F HG A1C>EQUAL 7.0%<8.0%: CPT | Performed by: NURSE PRACTITIONER

## 2025-06-03 PROCEDURE — 82607 VITAMIN B-12: CPT | Performed by: NURSE PRACTITIONER

## 2025-06-03 RX ORDER — METFORMIN HYDROCHLORIDE 750 MG/1
750 TABLET, EXTENDED RELEASE ORAL DAILY
Qty: 90 TABLET | Refills: 3 | Status: SHIPPED | OUTPATIENT
Start: 2025-06-03

## 2025-06-03 RX ORDER — LOSARTAN POTASSIUM 50 MG/1
50 TABLET ORAL DAILY
Qty: 90 TABLET | Refills: 3 | Status: SHIPPED | OUTPATIENT
Start: 2025-06-03

## 2025-06-03 RX ORDER — PIOGLITAZONE 45 MG/1
45 TABLET ORAL DAILY
Qty: 90 TABLET | Refills: 3 | Status: SHIPPED | OUTPATIENT
Start: 2025-06-03

## 2025-06-03 RX ORDER — BLOOD PRESSURE TEST KIT
KIT MISCELLANEOUS
Qty: 100 EACH | Refills: 3 | Status: SHIPPED | OUTPATIENT
Start: 2025-06-03

## 2025-06-03 RX ORDER — SIMVASTATIN 20 MG
20 TABLET ORAL AT BEDTIME
Qty: 90 TABLET | Refills: 3 | Status: SHIPPED | OUTPATIENT
Start: 2025-06-03

## 2025-06-03 RX ORDER — PREGABALIN 50 MG/1
50 CAPSULE ORAL DAILY
Qty: 90 CAPSULE | Refills: 1 | Status: SHIPPED | OUTPATIENT
Start: 2025-06-03

## 2025-06-03 SDOH — HEALTH STABILITY: PHYSICAL HEALTH: ON AVERAGE, HOW MANY DAYS PER WEEK DO YOU ENGAGE IN MODERATE TO STRENUOUS EXERCISE (LIKE A BRISK WALK)?: 4 DAYS

## 2025-06-03 ASSESSMENT — SOCIAL DETERMINANTS OF HEALTH (SDOH): HOW OFTEN DO YOU GET TOGETHER WITH FRIENDS OR RELATIVES?: MORE THAN THREE TIMES A WEEK

## 2025-06-03 ASSESSMENT — PATIENT HEALTH QUESTIONNAIRE - PHQ9
SUM OF ALL RESPONSES TO PHQ QUESTIONS 1-9: 9
10. IF YOU CHECKED OFF ANY PROBLEMS, HOW DIFFICULT HAVE THESE PROBLEMS MADE IT FOR YOU TO DO YOUR WORK, TAKE CARE OF THINGS AT HOME, OR GET ALONG WITH OTHER PEOPLE: SOMEWHAT DIFFICULT
SUM OF ALL RESPONSES TO PHQ QUESTIONS 1-9: 9

## 2025-06-03 NOTE — PATIENT INSTRUCTIONS
"Patient Education   Lacy Sonny Xeeb Saib Xyuas Kom Tiv Thaiv Tus Kheej  Nov yog ib co lacy sonny xeeb uas peb yeej meem muab tapan tib neeg pab lawv noj qab nyob zoo. Freddie zaum koj pab pawg saib xyuas yuav muaj ib co lacy sonny xeeb uas tshwj xeeb tapan koj nkaus xwb. Thov nrog koj pab pawg saib xyuas sib sanaz txog freddie yam uas koj toob mendez kom tiv thaiv koj tus kheej.  Ramona Coj Lub Neej  Es xaws xais ntau hillary 150 feeb txhua lub kothari tiam (30 feeb txhua hnub, 5 hnub ib lub kothari tiam).  Ua yam pab cov leeg muaj zog tuaj 2 zaug txhua lub kothari tiam. Freddie yam no pab koj tswj hwm koj lub cev qhov hnyav thiab tiv thaiv ntawm kab mob.  Txhob haus luam yeeb.  Pleev tshuaj tiv thaiv tshav kub kom thiaj tsis raug mob khees xaws ntawm nqaij tawv.  Txhua 2 mus tapan 3 xyoos yuav tau kuaj koj lub tsev tapan qhov radon. Radon yog ib valery marquise uas tsis muaj xim tsis muaj ntxhiab uas mob tau koj cov ntsws. Kom thiaj kawm ntxiv, mus saib [email or web address] thiab ntaus ntawv \"Radon in Homes.\"  Ceev cov phom kom tsis muaj mos txwv tapan hauv thiab muab xauv maciej hauv ib qho chaw nyab xeeb xws li lub txhoj, los yog muab xauv maciej thiab muab cov yawm sij zais maciej. Muab cov mos txwv xauv tapan hauv lwm qhov chaw nrug cov phom. Kom thiaj kawm ntxiv, mus saib [email or web address] thiab ntaus ntawv \"safe gun storage.\"  Ramona Noj Qab Huv  Noj 5 qho txiv hmab txiv ntoo thiab zaub txhua hnub.  Noj nplem nplej, txhuv xim av thiab cov fawm muaj nplej (los theej nplem dawb, txhuv dawb, thiab fawm dawb).  Ua tib zoo noj calcium thiab vitamin D ntau. Saib cov ntawv lo tapan pob khoom noj thiab siv zog noj kom txog 100% RDA (qhov ntau hauv ib hnub).  Yeej meem kuaj ntsuas  Txhua 6 lub hli mus kuaj hniav thiab muab tu.  Txhua xyoo mus saib koj pab pawg saib xyuas ramona noj qab nyob zoo kom sib sanaz txog:  Ib yam dab tsi uas hloov ntawm koj txoj ramona noj qab nyob zoo.  Cov tshuaj uas koj pab pawg tau hais kom siv.  Ramona saib xyuas kom tiv thaiv, ramona npaj tapan tsev neeg, thiab yuav ua li brianna tiv " thaiv ntawm kab mob uas ntev.  Ramona txhaj tshuaj (koob tshuaj tiv thaiv)   Cov koob tshuaj HPV (txog thaum muaj 24 xyoo), yog koj yeej tsis tau txais hillary li.  Cov koob tshuaj Hepatitis B Kab Mob Siab (txog thaum muaj 57 xyoos), yog koj yeej tsis tau txais hillary li.  Koob tshuaj COVID-19: Txais koob tshuaj no thaum txog caij txais.  Koob tshuaj tiv thaiv ntawm mob khaub thuas: Txais txhua xyoo.  Koob tshuaj tiv thaiv mob daig tsaig: Txais txhua 10 xyoo.  Pneumococcal, hepatitis A, thiab RSV cov koob tshuaj: Nug koj pab pawg saib xyuas yaneth puas tsim nyog tapan koj txais cov no raws li koj qhov pheej hmoo.  Koob tshuaj tiv thaiv ntawm kab mob sawv hlwv (tapan cov muaj 48 xyoo rov inna).  Ramona kuaj ntsuas tapan ramona noj qab nyob zoo  Kuaj mob ntshav qab zib:  Pib thaum muaj 33 xyoos, Mus kuaj mob ntshav qab zib txhua 1 xyoos los yog ntau zog.  Yog koj tseem tsis tau txog 33 xyoos, nug koj pab pawg saib xyuas yaneth puas tsim nyog tapan koj kuaj mob ntshav qab zib.  Kuaj cholesterol: Thaum muaj 37 xyoo, pib kuaj cholesterol txhua 3 xyoos, los yog ntau hillary ntawd yog kws nghia mob qhia.  Kuaj txha qhov tuab (DEXA): Thaum txog 48 xyoo lawd, nug koj pab pawg saib xyuas yaneth puas tsim nyog tapan koj kuaj txha yaneth puas ruaj.  Kab Mob Siab Hepatitis C: Kuaj ib zaug hauv koj lub neej.  Kuaj Txoj Hlab Ntshav Hauv Plab: Nrog koj tus kws nghia mob sanaz txog ramona kuaj ntsuas no yog koj:  Tau haus luam yeeb ib zaug li; thiab  Yog txiv neej; thiab  Nruab hnub nyoog 63 thiab 73.  Mob Mendez Cees (kis mob dhau ntawm ramona sib deev)  Ua ntej muaj 22 xyoos: Nug koj pab pawg saib xyuas yaneth puas tsim nyog kuaj tapan mob mendez cees.  Savage qab muaj 22 xyoos: Mus kuaj tapan mob mendez cees yog koj muaj ramona pheej hmoo. Koj muaj ramona pheej hmoo tapan mob mendez cees (suav nrog HIV) yog:  Koj sib deev nrog ntau hillary ib tug neeg.  Koj tsis siv cov hnab looj qau thaum sib deev.  Koj los yog koj tus khub deev kuaj pom tias muaj mob mendez cees lawm.  Yog koj muaj ramona pheej hmoo tapan mob mendez cees  HIV, nug txog cov tshuaj PrEP kom tiv thaiv ntawm HIV.  Mus kuaj tapan mob mendez cees HIV yam ntau hillary ib zaug hauv koj lub neej, txawm yog koj muaj ramona pheej hmoo tapan mob mendez cees HIV los tsis muaj los xij.  Kuaj tapan mob khees xaws  Kuaj lub ncauj tsev me nyuam tapan mob khees xaws: Yog koj muaj ib lub ncauj tsev me nyuam, molly yuav tau ib sij kuaj lub ncauj tsev me nyuam seb puas muaj mob khees xaws pib thaum muaj 19 xyoos. Neeg feem coob uas ib sij kuaj lub ncauj tsev me nyuam thiab tsis pom dab tsi txawv lawv tsum tau tory qab muaj 63 xyoos. Nrog koj tus kws nghia mob sib sanaz txog qhov no.  Kuaj lub mis tapan mob khees xaws (mammogram): Yog koj tau muaj mis hillary li, yuav tau ib sij kuaj lub mis pib thaum muaj 38 xyoo. Ramona kuaj no yog kom saib seb puas muaj mob khees xaws hauv lub mis.  Kuaj Txoj Hnyuv Tapan Mob Khees Xaws: Yeej tseem ceeb pib kuaj txoj hnyuv tapan mob khees xaws pib thaum muaj 43 xyoos.  Txhua 8 xyoo yuav tau kuaj txoj hnyuv (los yog ntau zog yog koj muaj ramona pheej hmoo) Los sis, nug koj tus kws nghia mob txog ramona kuaj thooj quav FIT txhua xyoo los yog Cologuard txhua 1 xyoos.  Kom thiaj kawm ntxiv txog freddie valery kuaj no, mus saib: [email or web address].  Yog xav tau ramona pab txog qhov no, mus saib: [email or web address].  Kuaj lub roula kua phev (prostate) tapan mob khees xaws: Yog koj muaj ib lub roula kua phev (prostate) thiab nruab hnub nyoog 53 mus tapan 67, nug koj tus kws nghia mob yaneth puas tsim nyog tapan koj kuaj lub roula kua phev.  Kuaj ntsws tapan mob khees xaws: Yog koj haus luam yeeb virk sim no los yog tau haus yav tas los uas muaj hnub nyoog nruab 50 xyoos mus tapan 80 xyoo, nug koj pab pawg saib xyuas yaneth puas tsim nyog tapan koj yeej meem kuaj lub ntsws tapan mob khees xaws.    Tapna cov valery phiaj ramona siv ua ntaub ntawv qhia nkaus xwb. Yuav tsis pauv ramona qhia los ntawm koj qhov chaw nghia mob. Copyright (chiquis jackson)   2023 [facility].   Txhua txoj renetta raug tswj tseg lawm. Tshaj xyuas los ntawm [facility]. [facility] [id number] - REV 04/24.  Hearing Loss: Care Instructions  Overview     Hearing loss is a sudden or slow decrease in how well you hear. It can range from slight to profound. Permanent hearing loss can occur with aging. It also can happen when you are exposed long-term to loud noise. Examples include listening to loud music, riding motorcycles, or being around other loud machines.  Hearing loss can affect your work and home life. It can make you feel lonely or depressed. You may feel that you have lost your independence. But hearing aids and other devices can help you hear better and feel connected to others.  Follow-up care is a key part of your treatment and safety. Be sure to make and go to all appointments, and call your doctor if you are having problems. It's also a good idea to know your test results and keep a list of the medicines you take.  How can you care for yourself at home?  Avoid loud noises whenever possible. This helps keep your hearing from getting worse.  Always wear hearing protection around loud noises.  Wear a hearing aid as directed.  A professional can help you pick a hearing aid that will work best for you.  You can also get hearing aids over the counter for mild to moderate hearing loss.  Have hearing tests as your doctor suggests. They can show whether your hearing has changed. Your hearing aid may need to be adjusted.  Use other devices as needed. These may include:  Telephone amplifiers and hearing aids that can connect to a television, stereo, radio, or microphone.  Devices that use lights or vibrations. These alert you to the doorbell, a ringing telephone, or a baby monitor.  Television closed-captioning. This shows the words at the bottom of the screen. Most new TVs can do this.  TTY (text telephone). This lets you type messages back and forth on the telephone instead of talking or listening. These devices are also called TDD. When messages are  "typed on the keyboard, they are sent over the phone line to a receiving TTY. The message is shown on a monitor.  Use text messaging, social media, and email if it is hard for you to communicate by telephone.  Try to learn a listening technique called speechreading. It is not lipreading. You pay attention to people's gestures, expressions, posture, and tone of voice. These clues can help you understand what a person is saying. Face the person you are talking to, and have them face you. Make sure the lighting is good. You need to see the other person's face clearly.  Think about counseling if you need help to adjust to your hearing loss.  When should you call for help?  Watch closely for changes in your health, and be sure to contact your doctor if:    You think your hearing is getting worse.     You have new symptoms, such as dizziness or nausea.   Where can you learn more?  Go to [email or web address]/patiented  Enter R798 in the search box to learn more about \"Hearing Loss: Care Instructions.\"  Current as of: October 27, 2024  Content Version: 14.4    4061-3772 [employer].   Care instructions adapted under license by your healthcare professional. If you have questions about a medical condition or this instruction, always ask your healthcare professional. [employer] disclaims any warranty or liability for your use of this information.    Bladder Training: Care Instructions  Your Care Instructions     Bladder training is used to treat urge incontinence and stress incontinence. Urge incontinence means that the need to urinate comes on so fast that you can't get to a toilet in time. Stress incontinence means that you leak urine because of pressure on your bladder. For example, it may happen when you laugh, cough, or lift something heavy.  Bladder training can increase how long you can wait before you have to urinate. It can also help your bladder hold more urine. And it can give you better " control over the urge to urinate.  It is important to remember that bladder training takes a few weeks to a few months to make a difference. You may not see results right away, but don't give up.  Follow-up care is a key part of your treatment and safety. Be sure to make and go to all appointments, and call your doctor if you are having problems. It's also a good idea to know your test results and keep a list of the medicines you take.  How can you care for yourself at home?  Work with your doctor to come up with a bladder training program that is right for you. You may use one or more of the following methods.  Delayed urination  In the beginning, try to keep from urinating for 5 minutes after you first feel the need to go.  While you wait, take deep, slow breaths to relax. Kegel exercises can also help you delay the need to go to the bathroom.  After some practice, when you can easily wait 5 minutes to urinate, try to wait 10 minutes before you urinate.  Slowly increase the waiting period until you are able to control when you have to urinate.  Scheduled urination  Empty your bladder when you first wake up in the morning.  Schedule times throughout the day when you will urinate.  Start by going to the bathroom every hour, even if you don't need to go.  Slowly increase the time between trips to the bathroom.  When you have found a schedule that works well for you, keep doing it.  If you wake up during the night and have to urinate, do it. Apply your schedule to waking hours only.  Kegel exercises  These tighten and strengthen pelvic muscles, which can help you control the flow of urine. (If doing these exercises causes pain, stop doing them and talk with your doctor.) To do Kegel exercises:  Squeeze your muscles as if you were trying not to pass gas. Or squeeze your muscles as if you were stopping the flow of urine. Your belly, legs, and buttocks shouldn't move.  Hold the squeeze for 3 seconds, then relax for 5 to  "10 seconds.  Start with 3 seconds, then add 1 second each week until you are able to squeeze for 10 seconds.  Repeat the exercise 10 times a session. Do 3 to 8 sessions a day.  When should you call for help?  Watch closely for changes in your health, and be sure to contact your doctor if:    Your incontinence is getting worse.     You do not get better as expected.   Where can you learn more?  Go to [email or web address]/patiented  Enter V684 in the search box to learn more about \"Bladder Training: Care Instructions.\"  Current as of: April 30, 2024  Content Version: 14.4    2890-4827 [employer].   Care instructions adapted under license by your healthcare professional. If you have questions about a medical condition or this instruction, always ask your healthcare professional. [employer] disclaims any warranty or liability for your use of this information.    Learning About Depression Screening  What is depression screening?  Depression screening is a way to see if you have depression symptoms. It may be done by a doctor or counselor. It's often part of a routine checkup. That's because your mental health is just as important as your physical health.  Depression is a mental health condition that affects how you feel, think, and act. You may:  Have less energy.  Lose interest in your daily activities.  Feel sad and grouchy for a long time.  Depression is very common. It affects people of all ages.  Many things can lead to depression. Some people become depressed after they have a stroke or find out they have a major illness like cancer or heart disease. The death of a loved one or a breakup may lead to depression. It can run in families. Most experts believe that a combination of inherited genes and stressful life events can cause it.  What happens during screening?  You may be asked to fill out a form about your depression symptoms. You and the doctor will discuss your answers. The " "doctor may ask you more questions to learn more about how you think, act, and feel.  What happens after screening?  If you have symptoms of depression, your doctor will talk to you about your options.  Doctors usually treat depression with medicines or counseling. Often, combining the two works best. Many people don't get help because they think that they'll get over the depression on their own. But people with depression may not get better unless they get treatment.  The cause of depression is not well understood. There may be many factors involved. But if you have depression, it's not your fault.  A serious symptom of depression is thinking about death or suicide. If you or someone you care about talks about this or about feeling hopeless, get help right away.  It's important to know that depression can be treated. Medicine, counseling, and self-care may help.  Where can you learn more?  Go to https://www.[employer].net/patiented  Enter T185 in the search box to learn more about \"Learning About Depression Screening.\"  Current as of: July 31, 2024  Content Version: 14.4    9481-7492 [employer].   Care instructions adapted under license by your healthcare professional. If you have questions about a medical condition or this instruction, always ask your healthcare professional. [employer] disclaims any warranty or liability for your use of this information.       "

## 2025-06-03 NOTE — PROGRESS NOTES
Preventive Care Visit  [facility]  Faith Genao NP, Family Medicine  [place] 3, 2025      Assessment & Plan     Encounter for Medicare annual wellness exam    Type 2 diabetes mellitus with diabetic neuropathy, without long-term current use of insulin (H)  Hgb A1c improved to 7.6.  Continue current management.  Follow up in 6 months.   - Albumin Random Urine Quantitative with Creat Ratio  - Hemoglobin A1c  - Alcohol Swabs PADS  Dispense: 100 each; Refill: 3  - empagliflozin (JARDIANCE) 25 MG TABS tablet  Dispense: 90 tablet; Refill: 3  - linagliptin (TRADJENTA) 5 MG TABS tablet  Dispense: 90 tablet; Refill: 3  - metFORMIN (GLUCOPHAGE-XR) 750 MG 24 hr tablet  Dispense: 90 tablet; Refill: 3  - pioglitazone (ACTOS) 45 MG tablet  Dispense: 90 tablet; Refill: 3    Benign essential hypertension  Well controlled.   - losartan (COZAAR) 50 MG tablet  Dispense: 90 tablet; Refill: 3    Mixed hyperlipidemia  - Lipid panel reflex to direct LDL Fasting  - simvastatin (ZOCOR) 20 MG tablet  Dispense: 90 tablet; Refill: 3    Chronic kidney disease, stage 3b (H)  Recheck labs.  Discussed a nephrology consult with patient and her daughter.  They decline at this time.  I would encourage a consult if we see further decline in her GFR.  - Comprehensive metabolic panel (BMP + Alb, Alk Phos, ALT, AST, Total. Bili, TP)  - Parathyroid Hormone Intact  - Phosphorus    Vitamin B12 deficiency (non anemic)  - Vitamin B12    Impacted cerumen of right ear  Right ear canal irrigated per provider assistant.  Patient tolerated well and cerumen was completely cleared.  - NE REMOVAL IMPACTED CERUMEN IRRIGATION/LVG UNILAT    Peripheral polyneuropathy  - pregabalin (LYRICA) 50 MG capsule  Dispense: 90 capsule; Refill: 1      The longitudinal plan of care for the diagnosis(es)/condition(s) as documented were addressed during this visit. Due to the added complexity in care, I will continue to support Tracie in the subsequent management  "and with ongoing continuity of care.          BMI  Estimated body mass index is 27.04 kg/m  as calculated from the following:    Height as of this encounter: 1.48 m (4' 10.27\").    Weight as of this encounter: 59.2 kg (130 lb 9.6 oz).   Weight management plan: Discussed healthy diet and exercise guidelines    Counseling  Appropriate preventive services were addressed with this patient via screening, questionnaire, or discussion as appropriate for fall prevention, nutrition, physical activity, Tobacco-use cessation, social engagement, weight loss and cognition.  Checklist reviewing preventive services available has been given to the patient.  Reviewed patient's diet, addressing concerns and/or questions.   The patient was provided with written information regarding signs of hearing loss.   Information on urinary incontinence and treatment options given to patient.   The patient's PHQ-9 score is consistent with mild depression. She was provided with information regarding depression.           Giulia Hodges is a 84 year old, presenting for the following:  Wellness Visit (CHECK EARS)    Patient is here with her daughter who is interpreting.  Patient declines a formal  today.    Will be getting new hearing aids and was told her right ear canal has excessive wax.    No questions or concerns.           6/3/2025     9:37 AM   Additional Questions   Roomed by WILLY RAE   Accompanied by DAUGHTER          Advance Care Planning    Discussed advance care planning with patient; however, patient declined at this time.        6/3/2025   General Health   How would you rate your overall physical health? Good   Feel stress (tense, anxious, or unable to sleep) Only a little   (!) STRESS CONCERN      6/3/2025   Nutrition   Diet: Regular (no restrictions)         6/3/2025   Exercise   Days per week of moderate/strenous exercise 4 days         6/3/2025   Social Factors   Frequency of gathering with friends or relatives More " than three times a week   Worry food won't last until get money to buy more No   Food not last or not have enough money for food? No   Do you have housing? (Housing is defined as stable permanent housing and does not include staying outside in a car, in a tent, in an abandoned building, in an overnight shelter, or couch-surfing.) Yes   Are you worried about losing your housing? No   Lack of transportation? No   Unable to get utilities (heat,electricity)? Yes   Want help with housing or utility concern? No   (!) FINANCIAL RESOURCE STRAIN CONCERN      6/3/2025   Fall Risk   Fallen 2 or more times in the past year? No   Trouble with walking or balance? No          6/3/2025   Activities of Daily Living- Home Safety   Needs help with the following daily activites None of the above   Safety concerns in the home None of the above         6/3/2025   Dental   Dentist two times every year? Yes         6/3/2025   Hearing Screening   Hearing concerns? (!) I NEED TO ASK PEOPLE TO SPEAK UP OR REPEAT THEMSELVES.    (!) IT'S HARD TO FOLLOW A CONVERSATION IN A NOISY RESTAURANT OR CROWDED ROOM.    (!) TROUBLE UNDERSTANDING SOFT OR WHISPERED SPEECH.    (!) TROUBLE UNDERSTANDING SPEECH ON THE TELEPHONE       Multiple values from one day are sorted in reverse-chronological order         6/3/2025   Driving Risk Screening   Patient/family members have concerns about driving No         6/3/2025   General Alertness/Fatigue Screening   Have you been more tired than usual lately? No         6/3/2025   Urinary Incontinence Screening   Bothered by leaking urine in past 6 months Yes       Today's PHQ-9 Score:       6/3/2025     9:16 AM   PHQ-9 SCORE   PHQ-9 Total Score MyChart 9 (Mild depression)   PHQ-9 Total Score 9        Patient-reported         6/3/2025   Substance Use   Alcohol more than 3/day or more than 7/wk Not Applicable   Do you have a current opioid prescription? No   How severe/bad is pain from 1 to 10? 6/10   Do you use any other  "substances recreationally? No     Social History     Tobacco Use    Smoking status: Never     Passive exposure: Never    Smokeless tobacco: Never   Vaping Use    Vaping status: Never Used                    Reviewed and updated as needed this visit by Provider   Tobacco  Allergies  Meds  Problems  Med Hx  Surg Hx  Fam Hx              Current providers sharing in care for this patient include:  Patient Care Team:  Faith Genao NP as PCP - General (Family Medicine)  Jenn Tena, RN as Lead Care Coordinator (Primary Care - CC)  Faith Genao NP as Assigned PCP    The following health maintenance items are reviewed in Epic and correct as of today:  Health Maintenance   Topic Date Due    DEXA  Never done    PARATHYROID  Never done    PHOSPHORUS  Never done    ZOSTER VACCINE (1 of 2) Never done    RSV VACCINE (1 - 1-dose 75+ series) Never done    EYE EXAM  04/15/2025    COVID-19 VACCINE (7 - 2024-25 season) 04/18/2025    LIPID  04/23/2025    MICROALBUMIN  04/25/2025    BMP  10/25/2025    DIABETIC FOOT EXAM  10/25/2025    HEMOGLOBIN  10/25/2025    A1C  12/03/2025    MEDICARE ANNUAL WELLNESS VISIT  06/03/2026    ANNUAL REVIEW OF HM ORDERS  06/03/2026    FALL RISK ASSESSMENT  06/03/2026    ADVANCE CARE PLANNING  04/23/2029    DTAP/TDAP/TD VACCINE (3 - Td or Tdap) 04/23/2034    PHQ-2 (once per calendar year)  Completed    INFLUENZA VACCINE  Completed    PNEUMOCOCCAL VACCINE 50+ YEARS  Completed    URINALYSIS  Completed    ALK PHOS  Completed    HPV VACCINE  Aged Out    MENINGITIS VACCINE  Aged Out            Objective    Exam  /62 (BP Location: Right arm, Patient Position: Sitting, Cuff Size: Adult Regular)   Pulse 69   Temp 98.1  F (36.7  C) (Oral)   Resp 18   Ht 1.48 m (4' 10.27\")   Wt 59.2 kg (130 lb 9.6 oz)   SpO2 94%   BMI 27.04 kg/m     Estimated body mass index is 27.04 kg/m  as calculated from the following:    Height as of this encounter: 1.48 m (4' 10.27\").    Weight as of this encounter: " 59.2 kg (130 lb 9.6 oz).    Physical Exam  GENERAL: alert and no distress  EYES: Eyes grossly normal to inspection, PERRL and conjunctivae and sclerae normal  HENT: left ear canals and TM normal. Unable to visualize right TM secondary to cerumen nose and mouth without ulcers or lesions   Post irrigation: right TM and canal normal.   NECK: no adenopathy, no asymmetry, masses, or scars  RESP: lungs clear to auscultation - no rales, rhonchi or wheezes  CV: regular rate and rhythm, normal S1 S2, no S3 or S4, no murmur, click or rub, no peripheral edema  ABDOMEN: soft, nontender, no hepatosplenomegaly, no masses and bowel sounds normal  MS: no gross musculoskeletal defects noted, no edema  SKIN: no suspicious lesions or rashes  NEURO: Normal strength and tone, mentation intact and speech normal  PSYCH: mentation appears normal, affect normal/bright          4/23/2024   Mini Cog   Mini-Cog Not Completed (choose reason) Language barrier and no  present              Signed Electronically by: Faith Genao NP    Answers submitted by the patient for this visit:  Patient Health Questionnaire (Submitted on 6/3/2025)  If you checked off any problems, how difficult have these problems made it for you to do your work, take care of things at home, or get along with other people?: Somewhat difficult  PHQ9 TOTAL SCORE: 9

## 2025-06-05 ENCOUNTER — RESULTS FOLLOW-UP (OUTPATIENT)
Dept: FAMILY MEDICINE | Facility: CLINIC | Age: 85
End: 2025-06-05

## 2025-08-29 ENCOUNTER — APPOINTMENT (OUTPATIENT)
Dept: RADIOLOGY | Facility: CLINIC | Age: 85
End: 2025-08-29
Payer: COMMERCIAL

## 2025-08-29 VITALS
DIASTOLIC BLOOD PRESSURE: 64 MMHG | BODY MASS INDEX: 27.33 KG/M2 | WEIGHT: 132 LBS | OXYGEN SATURATION: 91 % | TEMPERATURE: 97.2 F | RESPIRATION RATE: 16 BRPM | SYSTOLIC BLOOD PRESSURE: 177 MMHG | HEART RATE: 64 BPM

## 2025-08-29 PROCEDURE — 73070 X-RAY EXAM OF ELBOW: CPT | Mod: RT

## 2025-08-29 PROCEDURE — 73110 X-RAY EXAM OF WRIST: CPT | Mod: RT

## 2025-08-29 PROCEDURE — 25600 CLTX DST RDL FX/EPHYS SEP WO: CPT | Mod: RT

## 2025-08-29 PROCEDURE — 99283 EMERGENCY DEPT VISIT LOW MDM: CPT | Mod: 25

## 2025-08-29 PROCEDURE — 99284 EMERGENCY DEPT VISIT MOD MDM: CPT | Mod: 25

## 2025-08-29 PROCEDURE — 12001 RPR S/N/AX/GEN/TRNK 2.5CM/<: CPT

## 2025-08-29 PROCEDURE — 71046 X-RAY EXAM CHEST 2 VIEWS: CPT

## 2025-08-29 RX ORDER — OXYCODONE HYDROCHLORIDE 5 MG/1
5 TABLET ORAL ONCE
Refills: 0 | Status: COMPLETED | OUTPATIENT
Start: 2025-08-30 | End: 2025-08-30

## 2025-08-29 ASSESSMENT — COLUMBIA-SUICIDE SEVERITY RATING SCALE - C-SSRS
6. HAVE YOU EVER DONE ANYTHING, STARTED TO DO ANYTHING, OR PREPARED TO DO ANYTHING TO END YOUR LIFE?: NO
2. HAVE YOU ACTUALLY HAD ANY THOUGHTS OF KILLING YOURSELF IN THE PAST MONTH?: NO
1. IN THE PAST MONTH, HAVE YOU WISHED YOU WERE DEAD OR WISHED YOU COULD GO TO SLEEP AND NOT WAKE UP?: NO

## 2025-08-30 ENCOUNTER — HOSPITAL ENCOUNTER (EMERGENCY)
Facility: CLINIC | Age: 85
Discharge: HOME | End: 2025-08-30
Admitting: PHYSICIAN ASSISTANT
Payer: COMMERCIAL

## 2025-08-30 DIAGNOSIS — S51.011A ELBOW LACERATION, RIGHT, INITIAL ENCOUNTER: ICD-10-CM

## 2025-08-30 DIAGNOSIS — S51.811A SKIN TEAR OF RIGHT FOREARM WITHOUT COMPLICATION, INITIAL ENCOUNTER: ICD-10-CM

## 2025-08-30 DIAGNOSIS — S62.101A WRIST FRACTURE, RIGHT, CLOSED, INITIAL ENCOUNTER: Primary | ICD-10-CM

## 2025-08-30 PROCEDURE — 250N000013 HC RX MED GY IP 250 OP 250 PS 637: Performed by: PHYSICIAN ASSISTANT

## 2025-08-30 RX ORDER — OXYCODONE HYDROCHLORIDE 5 MG/1
5 TABLET ORAL EVERY 6 HOURS PRN
Qty: 10 TABLET | Refills: 0 | Status: SHIPPED | OUTPATIENT
Start: 2025-08-30

## 2025-08-30 RX ADMIN — OXYCODONE HYDROCHLORIDE 5 MG: 5 TABLET ORAL at 00:27

## 2025-09-02 ENCOUNTER — TRANSFERRED RECORDS (OUTPATIENT)
Dept: HEALTH INFORMATION MANAGEMENT | Facility: CLINIC | Age: 85
End: 2025-09-02
Payer: COMMERCIAL

## 2025-09-02 ENCOUNTER — PATIENT OUTREACH (OUTPATIENT)
Dept: GERIATRIC MEDICINE | Facility: CLINIC | Age: 85
End: 2025-09-02
Payer: COMMERCIAL